# Patient Record
Sex: MALE | Race: OTHER
[De-identification: names, ages, dates, MRNs, and addresses within clinical notes are randomized per-mention and may not be internally consistent; named-entity substitution may affect disease eponyms.]

---

## 2017-01-09 ENCOUNTER — APPOINTMENT (OUTPATIENT)
Dept: CARDIOLOGY | Facility: CLINIC | Age: 62
End: 2017-01-09

## 2017-01-09 VITALS
BODY MASS INDEX: 27.16 KG/M2 | WEIGHT: 169 LBS | DIASTOLIC BLOOD PRESSURE: 62 MMHG | SYSTOLIC BLOOD PRESSURE: 108 MMHG | HEART RATE: 66 BPM | HEIGHT: 66 IN

## 2017-01-09 DIAGNOSIS — Z01.818 ENCOUNTER FOR OTHER PREPROCEDURAL EXAMINATION: ICD-10-CM

## 2017-01-09 DIAGNOSIS — I10 ESSENTIAL (PRIMARY) HYPERTENSION: ICD-10-CM

## 2017-01-09 DIAGNOSIS — F17.200 NICOTINE DEPENDENCE, UNSPECIFIED, UNCOMPLICATED: ICD-10-CM

## 2017-01-09 DIAGNOSIS — L98.9 DISORDER OF THE SKIN AND SUBCUTANEOUS TISSUE, UNSPECIFIED: ICD-10-CM

## 2017-01-09 DIAGNOSIS — N18.6 END STAGE RENAL DISEASE: ICD-10-CM

## 2017-01-09 RX ORDER — VALGANCICLOVIR 450 MG/1
450 TABLET, FILM COATED ORAL DAILY
Refills: 0 | Status: ACTIVE | COMMUNITY

## 2017-01-09 RX ORDER — ZOLPIDEM TARTRATE 5 MG/1
TABLET ORAL
Refills: 0 | Status: ACTIVE | COMMUNITY

## 2017-01-09 RX ORDER — MYCOPHENOLATE MOFETIL 500 MG/1
500 TABLET, FILM COATED ORAL TWICE DAILY
Refills: 0 | Status: ACTIVE | COMMUNITY

## 2017-01-09 RX ORDER — TACROLIMUS 1 MG/1
1 CAPSULE, GELATIN COATED ORAL
Refills: 0 | Status: ACTIVE | COMMUNITY

## 2017-01-09 RX ORDER — FUROSEMIDE 20 MG/1
20 TABLET ORAL
Refills: 0 | Status: ACTIVE | COMMUNITY

## 2017-01-09 RX ORDER — CHOLECALCIFEROL (VITAMIN D3) 1250 MCG
1.25 MG CAPSULE ORAL WEEKLY
Refills: 0 | Status: ACTIVE | COMMUNITY

## 2017-01-09 RX ORDER — NIFEDIPINE 30 MG/1
30 TABLET, FILM COATED, EXTENDED RELEASE ORAL DAILY
Qty: 90 | Refills: 1 | Status: ACTIVE | COMMUNITY

## 2017-01-09 RX ORDER — METOPROLOL TARTRATE 25 MG/1
25 TABLET, FILM COATED ORAL 3 TIMES DAILY
Refills: 0 | Status: ACTIVE | COMMUNITY

## 2017-02-08 ENCOUNTER — OUTPATIENT (OUTPATIENT)
Dept: OUTPATIENT SERVICES | Facility: HOSPITAL | Age: 62
LOS: 1 days | Discharge: HOME | End: 2017-02-08

## 2017-05-22 ENCOUNTER — APPOINTMENT (OUTPATIENT)
Dept: PLASTIC SURGERY | Facility: CLINIC | Age: 62
End: 2017-05-22

## 2017-06-27 DIAGNOSIS — E11.9 TYPE 2 DIABETES MELLITUS WITHOUT COMPLICATIONS: ICD-10-CM

## 2017-06-27 DIAGNOSIS — F17.210 NICOTINE DEPENDENCE, CIGARETTES, UNCOMPLICATED: ICD-10-CM

## 2017-06-27 DIAGNOSIS — C44.722 SQUAMOUS CELL CARCINOMA OF SKIN OF RIGHT LOWER LIMB, INCLUDING HIP: ICD-10-CM

## 2017-06-27 DIAGNOSIS — L85.8 OTHER SPECIFIED EPIDERMAL THICKENING: ICD-10-CM

## 2017-06-27 DIAGNOSIS — I10 ESSENTIAL (PRIMARY) HYPERTENSION: ICD-10-CM

## 2017-11-21 ENCOUNTER — APPOINTMENT (OUTPATIENT)
Dept: PLASTIC SURGERY | Facility: CLINIC | Age: 62
End: 2017-11-21
Payer: MEDICARE

## 2017-11-21 VITALS — BODY MASS INDEX: 26.57 KG/M2 | WEIGHT: 165.34 LBS | HEIGHT: 66 IN

## 2017-11-21 DIAGNOSIS — L85.8 OTHER SPECIFIED EPIDERMAL THICKENING: ICD-10-CM

## 2017-11-21 PROCEDURE — 99212 OFFICE O/P EST SF 10 MIN: CPT

## 2017-11-21 RX ORDER — TACROLIMUS 1 MG/1
1 CAPSULE, GELATIN COATED ORAL
Refills: 0 | Status: ACTIVE | COMMUNITY

## 2017-11-21 RX ORDER — ZOLPIDEM TARTRATE 10 MG/1
10 TABLET ORAL
Refills: 0 | Status: ACTIVE | COMMUNITY

## 2018-05-22 ENCOUNTER — APPOINTMENT (OUTPATIENT)
Dept: PLASTIC SURGERY | Facility: CLINIC | Age: 63
End: 2018-05-22

## 2019-09-23 ENCOUNTER — OUTPATIENT (OUTPATIENT)
Dept: OUTPATIENT SERVICES | Facility: HOSPITAL | Age: 64
LOS: 1 days | Discharge: HOME | End: 2019-09-23
Payer: COMMERCIAL

## 2019-09-23 DIAGNOSIS — R63.4 ABNORMAL WEIGHT LOSS: ICD-10-CM

## 2019-09-23 DIAGNOSIS — Z94.0 KIDNEY TRANSPLANT STATUS: ICD-10-CM

## 2019-09-23 PROCEDURE — 71046 X-RAY EXAM CHEST 2 VIEWS: CPT | Mod: 26

## 2019-09-23 PROCEDURE — 76700 US EXAM ABDOM COMPLETE: CPT | Mod: 26

## 2019-11-08 ENCOUNTER — OUTPATIENT (OUTPATIENT)
Dept: OUTPATIENT SERVICES | Facility: HOSPITAL | Age: 64
LOS: 1 days | Discharge: HOME | End: 2019-11-08
Payer: COMMERCIAL

## 2019-11-08 DIAGNOSIS — R91.8 OTHER NONSPECIFIC ABNORMAL FINDING OF LUNG FIELD: ICD-10-CM

## 2019-11-08 PROCEDURE — 74176 CT ABD & PELVIS W/O CONTRAST: CPT | Mod: 26

## 2019-11-08 PROCEDURE — 71250 CT THORAX DX C-: CPT | Mod: 26

## 2021-11-01 ENCOUNTER — INPATIENT (INPATIENT)
Facility: HOSPITAL | Age: 66
LOS: 0 days | Discharge: HOME HEALTH | End: 2021-11-02
Attending: HOSPITALIST | Admitting: HOSPITALIST
Payer: COMMERCIAL

## 2021-11-01 VITALS
DIASTOLIC BLOOD PRESSURE: 79 MMHG | RESPIRATION RATE: 18 BRPM | TEMPERATURE: 98 F | SYSTOLIC BLOOD PRESSURE: 174 MMHG | HEART RATE: 80 BPM | OXYGEN SATURATION: 98 %

## 2021-11-01 LAB
ALBUMIN SERPL ELPH-MCNC: 4.5 G/DL — SIGNIFICANT CHANGE UP (ref 3.5–5.2)
ALP SERPL-CCNC: 95 U/L — SIGNIFICANT CHANGE UP (ref 30–115)
ALT FLD-CCNC: 10 U/L — SIGNIFICANT CHANGE UP (ref 0–41)
ANION GAP SERPL CALC-SCNC: 14 MMOL/L — SIGNIFICANT CHANGE UP (ref 7–14)
AST SERPL-CCNC: 18 U/L — SIGNIFICANT CHANGE UP (ref 0–41)
BASOPHILS # BLD AUTO: 0.04 K/UL — SIGNIFICANT CHANGE UP (ref 0–0.2)
BASOPHILS NFR BLD AUTO: 0.5 % — SIGNIFICANT CHANGE UP (ref 0–1)
BILIRUB SERPL-MCNC: 0.5 MG/DL — SIGNIFICANT CHANGE UP (ref 0.2–1.2)
BUN SERPL-MCNC: 14 MG/DL — SIGNIFICANT CHANGE UP (ref 10–20)
CALCIUM SERPL-MCNC: 9.4 MG/DL — SIGNIFICANT CHANGE UP (ref 8.5–10.1)
CHLORIDE SERPL-SCNC: 102 MMOL/L — SIGNIFICANT CHANGE UP (ref 98–110)
CO2 SERPL-SCNC: 21 MMOL/L — SIGNIFICANT CHANGE UP (ref 17–32)
CREAT SERPL-MCNC: 1 MG/DL — SIGNIFICANT CHANGE UP (ref 0.7–1.5)
EOSINOPHIL # BLD AUTO: 0.22 K/UL — SIGNIFICANT CHANGE UP (ref 0–0.7)
EOSINOPHIL NFR BLD AUTO: 2.8 % — SIGNIFICANT CHANGE UP (ref 0–8)
GLUCOSE SERPL-MCNC: 200 MG/DL — HIGH (ref 70–99)
HCT VFR BLD CALC: 44.3 % — SIGNIFICANT CHANGE UP (ref 42–52)
HGB BLD-MCNC: 15.4 G/DL — SIGNIFICANT CHANGE UP (ref 14–18)
IMM GRANULOCYTES NFR BLD AUTO: 0.3 % — SIGNIFICANT CHANGE UP (ref 0.1–0.3)
LYMPHOCYTES # BLD AUTO: 1.68 K/UL — SIGNIFICANT CHANGE UP (ref 1.2–3.4)
LYMPHOCYTES # BLD AUTO: 21.2 % — SIGNIFICANT CHANGE UP (ref 20.5–51.1)
MCHC RBC-ENTMCNC: 31.4 PG — HIGH (ref 27–31)
MCHC RBC-ENTMCNC: 34.8 G/DL — SIGNIFICANT CHANGE UP (ref 32–37)
MCV RBC AUTO: 90.4 FL — SIGNIFICANT CHANGE UP (ref 80–94)
MONOCYTES # BLD AUTO: 0.62 K/UL — HIGH (ref 0.1–0.6)
MONOCYTES NFR BLD AUTO: 7.8 % — SIGNIFICANT CHANGE UP (ref 1.7–9.3)
NEUTROPHILS # BLD AUTO: 5.36 K/UL — SIGNIFICANT CHANGE UP (ref 1.4–6.5)
NEUTROPHILS NFR BLD AUTO: 67.4 % — SIGNIFICANT CHANGE UP (ref 42.2–75.2)
NRBC # BLD: 0 /100 WBCS — SIGNIFICANT CHANGE UP (ref 0–0)
PLATELET # BLD AUTO: 180 K/UL — SIGNIFICANT CHANGE UP (ref 130–400)
POTASSIUM SERPL-MCNC: 4.4 MMOL/L — SIGNIFICANT CHANGE UP (ref 3.5–5)
POTASSIUM SERPL-SCNC: 4.4 MMOL/L — SIGNIFICANT CHANGE UP (ref 3.5–5)
PROT SERPL-MCNC: 7.5 G/DL — SIGNIFICANT CHANGE UP (ref 6–8)
RBC # BLD: 4.9 M/UL — SIGNIFICANT CHANGE UP (ref 4.7–6.1)
RBC # FLD: 12.3 % — SIGNIFICANT CHANGE UP (ref 11.5–14.5)
SARS-COV-2 RNA SPEC QL NAA+PROBE: SIGNIFICANT CHANGE UP
SODIUM SERPL-SCNC: 137 MMOL/L — SIGNIFICANT CHANGE UP (ref 135–146)
WBC # BLD: 7.94 K/UL — SIGNIFICANT CHANGE UP (ref 4.8–10.8)
WBC # FLD AUTO: 7.94 K/UL — SIGNIFICANT CHANGE UP (ref 4.8–10.8)

## 2021-11-01 PROCEDURE — 99406 BEHAV CHNG SMOKING 3-10 MIN: CPT

## 2021-11-01 PROCEDURE — 99223 1ST HOSP IP/OBS HIGH 75: CPT | Mod: 25

## 2021-11-01 PROCEDURE — 72131 CT LUMBAR SPINE W/O DYE: CPT | Mod: 26,MA

## 2021-11-01 PROCEDURE — 99285 EMERGENCY DEPT VISIT HI MDM: CPT

## 2021-11-01 RX ORDER — HEPARIN SODIUM 5000 [USP'U]/ML
5000 INJECTION INTRAVENOUS; SUBCUTANEOUS EVERY 8 HOURS
Refills: 0 | Status: DISCONTINUED | OUTPATIENT
Start: 2021-11-01 | End: 2021-11-02

## 2021-11-01 RX ORDER — LIDOCAINE 4 G/100G
1 CREAM TOPICAL DAILY
Refills: 0 | Status: DISCONTINUED | OUTPATIENT
Start: 2021-11-01 | End: 2021-11-02

## 2021-11-01 RX ORDER — MYCOPHENOLATE MOFETIL 250 MG/1
1 CAPSULE ORAL
Qty: 0 | Refills: 0 | DISCHARGE

## 2021-11-01 RX ORDER — REPAGLINIDE 1 MG/1
1 TABLET ORAL
Qty: 0 | Refills: 0 | DISCHARGE

## 2021-11-01 RX ORDER — METOPROLOL TARTRATE 50 MG
25 TABLET ORAL THREE TIMES A DAY
Refills: 0 | Status: DISCONTINUED | OUTPATIENT
Start: 2021-11-01 | End: 2021-11-02

## 2021-11-01 RX ORDER — NIFEDIPINE 30 MG
1 TABLET, EXTENDED RELEASE 24 HR ORAL
Qty: 0 | Refills: 0 | DISCHARGE

## 2021-11-01 RX ORDER — FUROSEMIDE 40 MG
1 TABLET ORAL
Qty: 0 | Refills: 0 | DISCHARGE

## 2021-11-01 RX ORDER — PANTOPRAZOLE SODIUM 20 MG/1
40 TABLET, DELAYED RELEASE ORAL
Refills: 0 | Status: DISCONTINUED | OUTPATIENT
Start: 2021-11-01 | End: 2021-11-02

## 2021-11-01 RX ORDER — NIFEDIPINE 30 MG
90 TABLET, EXTENDED RELEASE 24 HR ORAL DAILY
Refills: 0 | Status: DISCONTINUED | OUTPATIENT
Start: 2021-11-01 | End: 2021-11-02

## 2021-11-01 RX ORDER — ACETAMINOPHEN 500 MG
650 TABLET ORAL EVERY 6 HOURS
Refills: 0 | Status: DISCONTINUED | OUTPATIENT
Start: 2021-11-01 | End: 2021-11-02

## 2021-11-01 RX ORDER — FUROSEMIDE 40 MG
20 TABLET ORAL DAILY
Refills: 0 | Status: DISCONTINUED | OUTPATIENT
Start: 2021-11-01 | End: 2021-11-02

## 2021-11-01 RX ORDER — MORPHINE SULFATE 50 MG/1
4 CAPSULE, EXTENDED RELEASE ORAL ONCE
Refills: 0 | Status: DISCONTINUED | OUTPATIENT
Start: 2021-11-01 | End: 2021-11-01

## 2021-11-01 RX ORDER — CHLORHEXIDINE GLUCONATE 213 G/1000ML
1 SOLUTION TOPICAL
Refills: 0 | Status: DISCONTINUED | OUTPATIENT
Start: 2021-11-01 | End: 2021-11-02

## 2021-11-01 RX ORDER — MYCOPHENOLATE MOFETIL 250 MG/1
500 CAPSULE ORAL EVERY 12 HOURS
Refills: 0 | Status: DISCONTINUED | OUTPATIENT
Start: 2021-11-01 | End: 2021-11-02

## 2021-11-01 RX ORDER — MORPHINE SULFATE 50 MG/1
2 CAPSULE, EXTENDED RELEASE ORAL EVERY 6 HOURS
Refills: 0 | Status: DISCONTINUED | OUTPATIENT
Start: 2021-11-01 | End: 2021-11-02

## 2021-11-01 RX ORDER — TACROLIMUS 5 MG/1
5 CAPSULE ORAL EVERY 12 HOURS
Refills: 0 | Status: DISCONTINUED | OUTPATIENT
Start: 2021-11-01 | End: 2021-11-02

## 2021-11-01 RX ORDER — METOPROLOL TARTRATE 50 MG
25 TABLET ORAL
Qty: 0 | Refills: 0 | DISCHARGE

## 2021-11-01 RX ADMIN — MORPHINE SULFATE 4 MILLIGRAM(S): 50 CAPSULE, EXTENDED RELEASE ORAL at 23:09

## 2021-11-01 RX ADMIN — MORPHINE SULFATE 4 MILLIGRAM(S): 50 CAPSULE, EXTENDED RELEASE ORAL at 21:00

## 2021-11-01 NOTE — ED ADULT NURSE REASSESSMENT NOTE - NS ED NURSE REASSESS COMMENT FT1
transported to 3 E , AO x 4 , no SOB , denies any pain this time , IVL intact , no grimaced face note

## 2021-11-01 NOTE — ED PROVIDER NOTE - PHYSICAL EXAMINATION
CONST: NAD  EYES: Sclera and conjunctiva clear.   ENT: No nasal discharge. Oropharynx normal appearing, no erythema or exudates. No abscess or swelling. Uvula midline.   NECK: Non-tender, no meningeal signs. normal ROM. supple   CARD: S1 S2; No jvd  RESP: Equal BS B/L, No wheezes, rhonchi or rales. No distress  GI: Soft, non-tender, non-distended. no cva tenderness. normal BS  MS: Paraspinal lumbar tenderness. Normal ROM in all extremities. pulses 2 +. no calf tenderness or swelling  SKIN: Warm, dry, no acute rashes. Good turgor  NEURO: Pt amble to stand but unsteady on is feet. A&Ox3, No focal deficits. Strength 5/5 with no sensory deficits. Finger to nose intact. Negative pronator drift

## 2021-11-01 NOTE — ED PROVIDER NOTE - CLINICAL SUMMARY MEDICAL DECISION MAKING FREE TEXT BOX
Patient presented with worsening chronic back pain to the point that he cannot function at home or ambulate. Otherwise neurovascularly intact, afebrile, HD stable. Obtained labs which were grossly unremarkable including no significant leukocytosis, anemia, signs of dehydration/KAREEM, transaminitis or significant electrolyte abnormalities. CT lumbar spine negative for emergent findings. However despite attempted pain control in ED patient unable to ambulate, not safe discharge. Will require admission for further management. Hd stable at time of admission.

## 2021-11-01 NOTE — H&P ADULT - ATTENDING COMMENTS
65 YO M with a PMH of HTN, DM2, hx of renal transplant (on immunosuppressives), and CBP who presents to the hospital w/ a c/o difficulty ambulating due to progressively worsening back pain for the past x 2-3 months. Described as located in lumbar region, non-radiating, worse with movement, and constant. No trauma/falls. Denies any saddle anesthesia or bowel/bladder incontinence. No fevers/chills, rashes, ABD pain, CP, or SOB. ROS is negative except as above.    In the ED, CT-L spine w/o contrast showed no acute process, degenerative changes. Pt started on pain meds in the ED.     FMHx: Reviewed, not relevant    Physical exam shows pt in NAD. VSS, afebrile, not hypoxic on RA. A&Ox3. Neuro exam without deficits, motor/sensory intact, no dysarthria, no facial asymmetry. Muscle strength/sensation intact. CTA B/L with no W/C/R. RRR, no M/G/R. ABD is soft and non-tender, normoactive BSs. LEs without swelling. No rashes. Labs and radiology as above.    Difficulty ambulating due to acute on chronic back pain, non-traumatic; doubt cord compression. NSAIDs. Non-benzo muscle relaxers. Lidocaine patch. PRN pain meds. PT consult. Fall precautions.    Hx of HTN, DM2, and hx of renal transplant (on immunosuppressives). Restart home meds, except as stated above. DVT PPX. Inform PCP of pt's admission to hospital. My note supersedes the residents note.     Date seen by [email or web address] 65 YO M with a PMH of HTN, DM2, hx of renal transplant (on immunosuppressives), and CBP who presents to the hospital w/ a c/o difficulty ambulating due to progressively worsening back pain for the past x 2-3 months. Described as located in lumbar region, non-radiating, worse with movement, and constant. No trauma/falls. Denies any saddle anesthesia or bowel/bladder incontinence. No fevers/chills, rashes, ABD pain, CP, or SOB. ROS is negative except as above.    In the ED, CT-L spine w/o contrast showed no acute process, degenerative changes. Pt started on pain meds in the ED.     FMHx: Reviewed, not relevant    Physical exam shows pt in NAD. VSS, afebrile, not hypoxic on RA. A&Ox3. Neuro exam without deficits, motor/sensory intact, no dysarthria, no facial asymmetry. Muscle strength/sensation intact. CTA B/L with no W/C/R. RRR, no M/G/R. ABD is soft and non-tender, normoactive BSs. LEs without swelling. No rashes. Labs and radiology as above.    Difficulty ambulating due to acute on chronic back pain, non-traumatic; doubt cord compression. NSAIDs. Non-benzo muscle relaxers. Lidocaine patch. PRN pain meds. PT consult. Fall precautions.    Tobacco abuse with counseling. Pt extensively counseled on the benefits of smoking cessation and alternative therapies. Counseled for 15-20 minutes. Pt would like to think about their options prior to making a decision.     Hx of HTN, DM2, and hx of renal transplant (on immunosuppressives). Restart home meds, except as stated above. DVT PPX. Inform PCP of pt's admission to hospital. My note supersedes the residents note.     Date seen by [email or web address]

## 2021-11-01 NOTE — H&P ADULT - NSHPPHYSICALEXAM_GEN_ALL_CORE
- Physical Exam in ED  * General Appearance: Alert, cooperative, interactive, oriented to time, place, and person, in no acute distress  * Head: Normocephalic, without obvious abnormality, atraumatic  * Neck: Supple, symmetrical, trachea midline, no adenopathy;   * Thyroid:  No enlargement/tenderness/nodules; no carotid bruit or JVD  * Lungs: Respirations unlabored, Good bilateral air entry, normal breath sounds (Clear to auscultation bilaterally, no audible wheezes, crackles, or rhonchi)  * Heart: Regular Rate and Rhythm, normal S1 and S2, no audible murmur, rub, or gallop  * Abdomen: Symmetric, non-distended, no scar, soft, non-tender, bowel sounds active all four quadrants, no masses, no organomegaly (no hepatosplenomegaly)  * Extremities: Extremities normal, atraumatic, no cyanosis, no lower extremity pitting edema bilaterally, adequate dorsalis pedis pulses  * Pulses: 2+ and symmetric all extremities  * Skin: Skin color, texture, turgor normal, no rashes or lesions  * Lymph nodes: Cervical, supraclavicular, and axillary nodes normal  * Back exam: back brace in place, no scoliosis noted, no erythema, no palpable mass, no tenderness along lumbar area  * Gait: a little wide based gait, romberg negative  * Neurologic: CNII-XII intact, normal strength and sensation throughout, Romberg negative

## 2021-11-01 NOTE — ED PROVIDER NOTE - ATTENDING CONTRIBUTION TO CARE
66 year old male, pmhx as documented presenting with back pain x 5 years which has been worsening to the point that he has been unable to ambulate or care for himself at home. States pain is diffuse, mostly in lumbar spine, non-radiating, no palliative or provocative factors, severe. Denies incontinence/retention, LE weakness/numbness or fevers or any other complaints. Denies new trauma to the back.    Vital Signs: I have reviewed the initial vital signs.  Constitutional: NAD, well-nourished, appears stated age, no acute distress.  HEENT: Airway patent, moist MM, no erythema/swelling/deformity of oral structures. EOMI, PERRLA.  CV: regular rate, regular rhythm, well-perfused extremities, 2+ b/l DP and radial pulses equal.  Lungs: BCTA, no increased WOB.  ABD: NTND, no guarding or rebound, no pulsatile mass, no hernias.   MSK: Neck supple, nontender, nl ROM, no stepoff. Chest nontender. (+) midline lumbar tenderness. Ext nontender, nl rom, no deformity.   INTEG: Skin warm, dry, no rash.  NEURO: A&Ox3, normal strength, nl sensation throughout, normal speech.   PSYCH: Calm, cooperative, normal affect and interaction.    Will obtain CT lumbar spine, labs, pain control, re-eval.

## 2021-11-01 NOTE — ED PROVIDER NOTE - OBJECTIVE STATEMENT
66y M pmh Kidney transplant (2015), HTN presents for eval of back pain. Pt has worsening lower back pain x5yrs s/p trip and fall that now has made it difficult for him to ambulate, improvement when wearing a back brace. He has 14 steps in his house. Denies incontinence, fever, numbness, weakness

## 2021-11-01 NOTE — ED ADULT NURSE NOTE - INTERVENTIONS DEFINITIONS
[place] to call system/Call bell, personal items and telephone within reach/Instruct patient to call for assistance/Room bathroom lighting operational/Non-slip footwear when patient is off stretcher/Physically safe environment: no spills, clutter or unnecessary equipment/Provide visual cue, wrist band, yellow gown, etc./Monitor gait and stability/Monitor for mental status changes and reorient to person, place, and time/Review medications for side effects contributing to fall risk/Reinforce activity limits and safety measures with patient and family/Provide visual clues: red socks

## 2021-11-01 NOTE — H&P ADULT - HISTORY OF PRESENT ILLNESS
History of Present Illness    Mr. Abad is a 66 year old (active smoker) Italian Speaking male patient known to have:  - Baseline: AOx3; lives with wife at home; ambulates independently unassisted; has 14 steps at home  - Hypertension. Home meds Lopressor 25mg TID, Nifedipine 90mg QD, Lasix 20mg QD  - DM II. No hba1c. Home med Repgaglinide 1mg QD  - History of kidney transplant in [place] in 2015. Patient unsure why. He follows with Dr Moreno located at [address] [place] [address]. Home meds Tacrolimus 5mg BID and Mycophenolate Mofetil 500mg BID. Not on steroids  - Chronic Back Pain. History of fall in 2016 while heading to answer phone. Has been reporting deterioration in ability to ambulate and has been placing a back brace since then      He presented to the ED on 11/01 for evaluation of worsening back pain.  History goes back to few months prior to presentation when the patient started complaining of worsening in his baseline back pain.  Patient describes that the back pain is located along the lower aspect of the back in lumbar area.  It is dull in nature, non radiating, and of around 6/10 intensity at its peak (does not wake him up at night, however).  It has been occurring intermittently over the last few months: exacerbated with cold whether or upon lying supine and improved with sitting (has been sleeping in a semi-seated position for some time by now) and exertion.  The pain has been limiting his daily activity, so patient decided to present to ED 11/01.  Patient denies associated urinary or fecal incontinence, recent fever, or chills.      On review of systems, patient reports chronic bilateral knee pain that has limited his ambulatory function but denies any recent fever, chills, night sweats, URTI symptoms (cough, rhinorrhea, sore throat), urinary symptoms (urinary frequency, urgency, intermittence, dysuria, foul smelling urine, cloudy urine), change in bowel movements (diarrhea or constipation), abdominal pain, headache, nausea, or vomiting.   No sick contacts.   No recent travel or exposure to recent travelers.      Upon presentation to the ED, the patient was hemodynamically stable:  Vital Signs in ED   - /79 mmHg  - HR 80 bpm  - RR 18 bpm  - T 97.8  - SaO2 98% on RA      Investigations   Laboratory Workup  - CBC:                        15.4   7.94  )-----------( 180      ( 01 Nov 2021 16:43 )             44.3     - Chemistry:  11-01    137  |  102  |  14  ----------------------------<  200<H>  4.4   |  21  |  1.0    Ca    9.4      01 Nov 2021 16:43    TPro  7.5  /  Alb  4.5  /  TBili  0.5  /  DBili  x   /  AST  18  /  ALT  10  /  AlkPhos  95  11-01      Microbiological Workup  * COVID negative      Radiological Workup  * CT Lumbar spine no acute process/ fracture      - Patient was given a dose of IV Morphine 4mg x1 in ED  - He will be admitted to the floor for further investigation, monitoring, PT evaluation, and management.

## 2021-11-01 NOTE — H&P ADULT - ASSESSMENT
Assessment and Plan  Case of a 66 year old male patient with multiple comorbidities including Hypertension, Diabetes Mellitus, chronic back pain since fall in 2016, and history of renal transplant in 2015 who presented to the ED on 11/01 for evaluation of worsening lower back pain, found to have no fracture on CT L-spine without contrast, to be admitted under Medicine for further evaluation, investigations, PT evaluation, and monitoring. Currently hemodynamically stable.      Worsening Back Pain  History of Chronic Back Pain  Rule Out Spinal Stenosis  Unlikely Abscess in absence of IV drug use, fever, tenderness despite being transplant patient (not on steroids)  * Not on pain meds at home  * Uses back brace at home since 2016  * History of fall in 2016 while heading to answer phone. Has been reporting deterioration in ability to ambulate  * CT L-spine without contrast 11/01 no acute process (fracture)  * S/P IV morphine 4mg x1 dose in ED    - Monitor back pain and keep score at 01/10  - Consider MRI imaging to rule out lumbar spinal stenosis since back pain improves on flexion/sitting and worsens on extension/lying supine  - Start IV morphine 2mg Q6h PRN and Tylenol PRN for pain  - Place lidocaine patch  - Consult PT/OT  - Consult  since patient has 14 steps at home and he has been having difficulty ambulating independently   - Check Duplex venous LE and start DVT prophylaxis      History of Renal Transplant  * Performed in [place] in 2015  * Patient unsure why  * Follows with Dr Moreno located at [address] [place] [address]  * Home meds Tacrolimus 5mg BID and Mycophenolate Mofetil 500mg BID. Not on prednisone  * ED BUN 14, Cr 1.0    - Consider nephrology consult   - Check tacrolimus trough and Mycophenolate levels and adjust doses accordingly  - Not on prednisone  - Trend BUN and Cr daily  - Monitor BP  - Avoid nephrotoxic agents and contrast exposure if possible      Hypertension  * Home meds Lopressor 25mg TID, Nifedipine 90mg QD, Lasix 20mg QD  * ED /79 mmHg  - Monitor BP closely  - Resume home meds: Lopressor 25mg TID, Nifedipine 90mg QD, Lasix 20mg QD      DM II  * No hba1c  * Home med Repgaglinide 1mg QD  - Check Hba1c  - Monitor POCT premeals and at bedtime  - Start Apidra sliding scale B if POCT >180-200      Others  - DVT Prophylaxis: Heparin 5000 Subcutaneously Q8h  - GI Prophylaxis: Pantoprazole 40mg PO QD  - Diet: DASH/TLC  - Code Status: Full      Barriers to learning: NO  Discharge Planning: Patient will be discharged once stable   Plan was communicated with patient and medical team      Margie Quintero MD  PGY - 2 Internal Medicine   [facility]

## 2021-11-02 ENCOUNTER — TRANSCRIPTION ENCOUNTER (OUTPATIENT)
Age: 66
End: 2021-11-02

## 2021-11-02 VITALS
TEMPERATURE: 97 F | SYSTOLIC BLOOD PRESSURE: 152 MMHG | DIASTOLIC BLOOD PRESSURE: 65 MMHG | HEART RATE: 77 BPM | RESPIRATION RATE: 18 BRPM

## 2021-11-02 LAB
A1C WITH ESTIMATED AVERAGE GLUCOSE RESULT: 6.9 % — HIGH (ref 4–5.6)
ALBUMIN SERPL ELPH-MCNC: 4.4 G/DL — SIGNIFICANT CHANGE UP (ref 3.5–5.2)
ALP SERPL-CCNC: 91 U/L — SIGNIFICANT CHANGE UP (ref 30–115)
ALT FLD-CCNC: 9 U/L — SIGNIFICANT CHANGE UP (ref 0–41)
ANION GAP SERPL CALC-SCNC: 12 MMOL/L — SIGNIFICANT CHANGE UP (ref 7–14)
AST SERPL-CCNC: 11 U/L — SIGNIFICANT CHANGE UP (ref 0–41)
BASOPHILS # BLD AUTO: 0.03 K/UL — SIGNIFICANT CHANGE UP (ref 0–0.2)
BASOPHILS NFR BLD AUTO: 0.6 % — SIGNIFICANT CHANGE UP (ref 0–1)
BILIRUB SERPL-MCNC: 0.4 MG/DL — SIGNIFICANT CHANGE UP (ref 0.2–1.2)
BUN SERPL-MCNC: 17 MG/DL — SIGNIFICANT CHANGE UP (ref 10–20)
CALCIUM SERPL-MCNC: 9.5 MG/DL — SIGNIFICANT CHANGE UP (ref 8.5–10.1)
CHLORIDE SERPL-SCNC: 102 MMOL/L — SIGNIFICANT CHANGE UP (ref 98–110)
CHOLEST SERPL-MCNC: 144 MG/DL — SIGNIFICANT CHANGE UP
CO2 SERPL-SCNC: 26 MMOL/L — SIGNIFICANT CHANGE UP (ref 17–32)
CREAT SERPL-MCNC: 1 MG/DL — SIGNIFICANT CHANGE UP (ref 0.7–1.5)
EOSINOPHIL # BLD AUTO: 0.25 K/UL — SIGNIFICANT CHANGE UP (ref 0–0.7)
EOSINOPHIL NFR BLD AUTO: 4.8 % — SIGNIFICANT CHANGE UP (ref 0–8)
ESTIMATED AVERAGE GLUCOSE: 151 MG/DL — HIGH (ref 68–114)
GLUCOSE BLDC GLUCOMTR-MCNC: 464 MG/DL — CRITICAL HIGH (ref 70–99)
GLUCOSE BLDC GLUCOMTR-MCNC: 474 MG/DL — CRITICAL HIGH (ref 70–99)
GLUCOSE SERPL-MCNC: 244 MG/DL — HIGH (ref 70–99)
HCT VFR BLD CALC: 43.5 % — SIGNIFICANT CHANGE UP (ref 42–52)
HCV AB S/CO SERPL IA: 0.04 COI — SIGNIFICANT CHANGE UP
HCV AB SERPL-IMP: SIGNIFICANT CHANGE UP
HDLC SERPL-MCNC: 51 MG/DL — SIGNIFICANT CHANGE UP
HGB BLD-MCNC: 14.8 G/DL — SIGNIFICANT CHANGE UP (ref 14–18)
IMM GRANULOCYTES NFR BLD AUTO: 0.4 % — HIGH (ref 0.1–0.3)
LIPID PNL WITH DIRECT LDL SERPL: 73 MG/DL — SIGNIFICANT CHANGE UP
LYMPHOCYTES # BLD AUTO: 1.49 K/UL — SIGNIFICANT CHANGE UP (ref 1.2–3.4)
LYMPHOCYTES # BLD AUTO: 28.4 % — SIGNIFICANT CHANGE UP (ref 20.5–51.1)
MAGNESIUM SERPL-MCNC: 1.5 MG/DL — LOW (ref 1.8–2.4)
MCHC RBC-ENTMCNC: 31.5 PG — HIGH (ref 27–31)
MCHC RBC-ENTMCNC: 34 G/DL — SIGNIFICANT CHANGE UP (ref 32–37)
MCV RBC AUTO: 92.6 FL — SIGNIFICANT CHANGE UP (ref 80–94)
MONOCYTES # BLD AUTO: 0.73 K/UL — HIGH (ref 0.1–0.6)
MONOCYTES NFR BLD AUTO: 13.9 % — HIGH (ref 1.7–9.3)
NEUTROPHILS # BLD AUTO: 2.73 K/UL — SIGNIFICANT CHANGE UP (ref 1.4–6.5)
NEUTROPHILS NFR BLD AUTO: 51.9 % — SIGNIFICANT CHANGE UP (ref 42.2–75.2)
NON HDL CHOLESTEROL: 93 MG/DL — SIGNIFICANT CHANGE UP
NRBC # BLD: 0 /100 WBCS — SIGNIFICANT CHANGE UP (ref 0–0)
PHOSPHATE SERPL-MCNC: 3.6 MG/DL — SIGNIFICANT CHANGE UP (ref 2.1–4.9)
PLATELET # BLD AUTO: 167 K/UL — SIGNIFICANT CHANGE UP (ref 130–400)
POTASSIUM SERPL-MCNC: 4.1 MMOL/L — SIGNIFICANT CHANGE UP (ref 3.5–5)
POTASSIUM SERPL-SCNC: 4.1 MMOL/L — SIGNIFICANT CHANGE UP (ref 3.5–5)
PROT SERPL-MCNC: 7.2 G/DL — SIGNIFICANT CHANGE UP (ref 6–8)
RBC # BLD: 4.7 M/UL — SIGNIFICANT CHANGE UP (ref 4.7–6.1)
RBC # FLD: 12.5 % — SIGNIFICANT CHANGE UP (ref 11.5–14.5)
SODIUM SERPL-SCNC: 140 MMOL/L — SIGNIFICANT CHANGE UP (ref 135–146)
TACROLIMUS SERPL-MCNC: 10.3 NG/ML — SIGNIFICANT CHANGE UP
TRIGL SERPL-MCNC: 138 MG/DL — SIGNIFICANT CHANGE UP
TSH SERPL-MCNC: 2.4 UIU/ML — SIGNIFICANT CHANGE UP (ref 0.27–4.2)
WBC # BLD: 5.25 K/UL — SIGNIFICANT CHANGE UP (ref 4.8–10.8)
WBC # FLD AUTO: 5.25 K/UL — SIGNIFICANT CHANGE UP (ref 4.8–10.8)

## 2021-11-02 PROCEDURE — 99239 HOSP IP/OBS DSCHRG MGMT >30: CPT

## 2021-11-02 PROCEDURE — 71045 X-RAY EXAM CHEST 1 VIEW: CPT | Mod: 26

## 2021-11-02 RX ORDER — METHOCARBAMOL 500 MG/1
500 TABLET, FILM COATED ORAL ONCE
Refills: 0 | Status: COMPLETED | OUTPATIENT
Start: 2021-11-02 | End: 2021-11-02

## 2021-11-02 RX ORDER — IBUPROFEN 200 MG
400 TABLET ORAL EVERY 6 HOURS
Refills: 0 | Status: DISCONTINUED | OUTPATIENT
Start: 2021-11-02 | End: 2021-11-02

## 2021-11-02 RX ORDER — INSULIN HUMAN 100 [IU]/ML
10 INJECTION, SOLUTION SUBCUTANEOUS ONCE
Refills: 0 | Status: COMPLETED | OUTPATIENT
Start: 2021-11-02 | End: 2021-11-02

## 2021-11-02 RX ORDER — METHOCARBAMOL 500 MG/1
500 TABLET, FILM COATED ORAL EVERY 6 HOURS
Refills: 0 | Status: DISCONTINUED | OUTPATIENT
Start: 2021-11-02 | End: 2021-11-02

## 2021-11-02 RX ORDER — METHOCARBAMOL 500 MG/1
1 TABLET, FILM COATED ORAL
Qty: 30 | Refills: 0
Start: 2021-11-02 | End: 2021-11-11

## 2021-11-02 RX ADMIN — INSULIN HUMAN 10 UNIT(S): 100 INJECTION, SOLUTION SUBCUTANEOUS at 16:58

## 2021-11-02 RX ADMIN — Medication 90 MILLIGRAM(S): at 05:39

## 2021-11-02 RX ADMIN — MYCOPHENOLATE MOFETIL 500 MILLIGRAM(S): 250 CAPSULE ORAL at 05:39

## 2021-11-02 RX ADMIN — Medication 25 MILLIGRAM(S): at 05:39

## 2021-11-02 RX ADMIN — LIDOCAINE 1 PATCH: 4 CREAM TOPICAL at 12:24

## 2021-11-02 RX ADMIN — METHOCARBAMOL 500 MILLIGRAM(S): 500 TABLET, FILM COATED ORAL at 12:24

## 2021-11-02 RX ADMIN — HEPARIN SODIUM 5000 UNIT(S): 5000 INJECTION INTRAVENOUS; SUBCUTANEOUS at 14:49

## 2021-11-02 RX ADMIN — PANTOPRAZOLE SODIUM 40 MILLIGRAM(S): 20 TABLET, DELAYED RELEASE ORAL at 05:39

## 2021-11-02 RX ADMIN — Medication 20 MILLIGRAM(S): at 05:39

## 2021-11-02 RX ADMIN — Medication 25 MILLIGRAM(S): at 16:49

## 2021-11-02 RX ADMIN — HEPARIN SODIUM 5000 UNIT(S): 5000 INJECTION INTRAVENOUS; SUBCUTANEOUS at 05:39

## 2021-11-02 RX ADMIN — TACROLIMUS 5 MILLIGRAM(S): 5 CAPSULE ORAL at 05:39

## 2021-11-02 NOTE — PROGRESS NOTE ADULT - SUBJECTIVE AND OBJECTIVE BOX
HPI  Mr. Abad is a 66 year old (active smoker) Italian Speaking male patient known to have: Baseline: AOx3; lives with wife at home; ambulates independently unassisted; has 14 steps at home, Hypertension. Home meds Lopressor 25mg TID, Nifedipine 90mg QD, Lasix 20mg QD, DM II. No hba1c. Home med Repgaglinide 1mg QD, History of kidney transplant in [place] in 2015. Patient unsure why. He follows with Dr Moreno located at [address] [place] [address]. Home meds Tacrolimus 5mg BID and Mycophenolate Mofetil 500mg BID. Not on steroids, Chronic Back Pain. History of fall in 2016 while heading to answer phone. Has been reporting deterioration in ability to ambulate and has been placing a back brace since then.  He presented to the ED on 11/01 for evaluation of worsening back pain. History goes back to few months prior to presentation when the patient started complaining of worsening in his baseline back pain. Patient describes that the back pain is located along the lower aspect of the back in lumbar area. It is dull in nature, non radiating, and of around 6/10 intensity at its peak (does not wake him up at night, however). It has been occurring intermittently over the last few months: exacerbated with cold whether or upon lying supine and improved with sitting (has been sleeping in a semi-seated position for some time by now) and exertion. The pain has been limiting his daily activity, so patient decided to present to ED 11/01. Patient denies associated urinary or fecal incontinence, recent fever, or chills. On review of systems, patient reports chronic bilateral knee pain that has limited his ambulatory function but denies any recent fever, chills, night sweats, URTI symptoms (cough, rhinorrhea, sore throat), urinary symptoms (urinary frequency, urgency, intermittence, dysuria, foul smelling urine, cloudy urine), change in bowel movements (diarrhea or constipation), abdominal pain, headache, nausea, or vomiting. No sick contacts. No recent travel or exposure to recent travelers.    Currently admitted to medicine with the primary diagnosis of Back pain     Today is hospital day 1d.     INTERVAL HPI / OVERNIGHT EVENTS:  Patient was examined and seen at bedside. This morning he is resting comfortably in bed and reports no new issues or overnight events.     ROS: All systems negative except as stated in HPI    PAST MEDICAL & SURGICAL HISTORY    ALLERGIES  No Known Allergies    MEDICATIONS  STANDING MEDICATIONS  chlorhexidine 4% Liquid 1 Application(s) Topical <User Schedule>  furosemide    Tablet 20 milliGRAM(s) Oral daily  heparin   Injectable 5000 Unit(s) SubCutaneous every 8 hours  lidocaine   4% Patch 1 Patch Transdermal daily  methocarbamol 500 milliGRAM(s) Oral every 6 hours  metoprolol tartrate 25 milliGRAM(s) Oral three times a day  mycophenolate mofetil  Oral Tab/Cap - Peds 500 milliGRAM(s) Oral every 12 hours  NIFEdipine XL 90 milliGRAM(s) Oral daily  pantoprazole    Tablet 40 milliGRAM(s) Oral before breakfast  tacrolimus 5 milliGRAM(s) Oral every 12 hours    PRN MEDICATIONS  acetaminophen     Tablet .. 650 milliGRAM(s) Oral every 6 hours PRN  ibuprofen  Tablet. 400 milliGRAM(s) Oral every 6 hours PRN    VITALS:  T(F): 97.3  HR: 77  BP: 152/65  RR: 18  SpO2: 98%    PHYSICAL EXAM  GEN: NAD, Resting comfortably in bed  PULM: Clear to auscultation bilaterally, No wheezes  CVS: Regular rate and rhythm, S1-S2, no murmurs  ABD: Soft, non-tender, non-distended, no guarding  EXT: No edema  NEURO: A&Ox3, no focal deficits,     LABS                        14.8   5.25  )-----------( 167      ( 02 Nov 2021 04:30 )             43.5     11-02    140  |  102  |  17  ----------------------------<  244<H>  4.1   |  26  |  1.0    Ca    9.5      02 Nov 2021 04:30  Phos  3.6     11-02  Mg     1.5     11-02    TPro  7.2  /  Alb  4.4  /  TBili  0.4  /  DBili  x   /  AST  11  /  ALT  9   /  AlkPhos  91  11-02                  RADIOLOGY

## 2021-11-02 NOTE — OCCUPATIONAL THERAPY INITIAL EVALUATION ADULT - GENERAL OBSERVATIONS, REHAB EVAL
Pt received semi barcenas in bed in NAD< agreeable to OT evaluation, +IV lock, left semi barcenas in bed in NAD

## 2021-11-02 NOTE — PHYSICAL THERAPY INITIAL EVALUATION ADULT - GAIT DEVIATIONS NOTED, PT EVAL
occasional crouch/decreased erasmo/decreased velocity of limb motion/decreased step length/decreased stride length

## 2021-11-02 NOTE — PHYSICAL THERAPY INITIAL EVALUATION ADULT - PLANNED THERAPY INTERVENTIONS, PT EVAL
STAIR TRAINING. Goal: Pt will ascend/descend 10 stairs with supervision using one rail by discharge to facilitate return to PLOF./balance training/bed mobility training/gait training/transfer training

## 2021-11-02 NOTE — OCCUPATIONAL THERAPY INITIAL EVALUATION ADULT - PERTINENT HX OF CURRENT PROBLEM, REHAB EVAL
Mr. Abad is a 66 year old (active smoker) Belarusian Speaking male patient known to have: Baseline: AOx3; lives with wife at home

## 2021-11-02 NOTE — DISCHARGE NOTE PROVIDER - NSDCCPCAREPLAN_GEN_ALL_CORE_FT
PRINCIPAL DISCHARGE DIAGNOSIS  Diagnosis: Back pain  Assessment and Plan of Treatment: Assessment is Worsening Back Pain with a History of Chronic Back Pain.   History of not on pain meds at home, uses back brace at home since 2016, history of fall in 2016 while heading to answer phone. Has been reporting deterioration in ability to ambulate. CT L-spine without contrast 11/01 no acute process (fracture). Received IV morphine 4mg x1 dose in ED. Evaluated by Physical Therapy. At the hospital, started on PRN Ibuprofen 400 mg q6hrs and started Robaxin 500 mg q6hrs. Please continue to take medications on discharge medication list until told otherwise by your provider. Please follow up with outpatient appointments.

## 2021-11-02 NOTE — OCCUPATIONAL THERAPY INITIAL EVALUATION ADULT - ORIENTATION, REHAB EVAL
chart states pt speaks Tajik, however pt communicated clearly and understood english, declined  phone/oriented to person, place, time and situation

## 2021-11-02 NOTE — PHYSICAL THERAPY INITIAL EVALUATION ADULT - LIVES WITH, PROFILE
independent a house with 5 steps to enter and 5 inside/spouse independent a house with 5 steps to enter and 7 to living space/spouse

## 2021-11-02 NOTE — DISCHARGE NOTE PROVIDER - NSDCMRMEDTOKEN_GEN_ALL_CORE_FT
Lasix 20 mg oral tablet: 1 tab(s) orally once a day  Lopressor 50 mg oral tablet: 25 milligram(s) orally 3 times a day  mycophenolate mofetil 500 mg oral tablet: 1 tab(s) orally 2 times a day  NIFEdipine 90 mg oral tablet, extended release: 1 tab(s) orally once a day  repaglinide 1 mg oral tablet: 1 tab(s) orally 3 times a day (before meals)  tacrolimus 5 mg oral capsule: 1 cap(s) orally every 12 hours   Endocet 10/325 oral tablet: 1 tab(s) orally 2 times a day MDD:2  Lasix 20 mg oral tablet: 1 tab(s) orally once a day  Lopressor 50 mg oral tablet: 25 milligram(s) orally 3 times a day  methocarbamol 500 mg oral tablet: 1 tab(s) orally every 8 hours   mycophenolate mofetil 500 mg oral tablet: 1 tab(s) orally 2 times a day  NIFEdipine 90 mg oral tablet, extended release: 1 tab(s) orally once a day  repaglinide 1 mg oral tablet: 1 tab(s) orally 3 times a day (before meals)  tacrolimus 5 mg oral capsule: 1 cap(s) orally every 12 hours

## 2021-11-02 NOTE — DISCHARGE NOTE PROVIDER - CARE PROVIDER_API CALL
CELESTINO CABELLO  Internal Medicine  N  IRINEO TIRADO, Phys,    Phone: [phone]  Fax: [phone]  Follow Up Time: 2 weeks

## 2021-11-02 NOTE — DISCHARGE NOTE PROVIDER - HOSPITAL COURSE
11/1:   - HPI: Mr. Abad is a 66 year old (active smoker) Spanish Speaking male patient known to have: Baseline: AOx3; lives with wife at home; ambulates independently unassisted; has 14 steps at home, Hypertension. Home meds Lopressor 25mg TID, Nifedipine 90mg QD, Lasix 20mg QD, DM II. No hba1c. Home med Repgaglinide 1mg QD, History of kidney transplant in [place] in 2015. Patient unsure why. He follows with Dr Moreno located at [address] [place] [address]. Home meds Tacrolimus 5mg BID and Mycophenolate Mofetil 500mg BID. Not on steroids, Chronic Back Pain. History of fall in 2016 while heading to answer phone. Has been reporting deterioration in ability to ambulate and has been placing a back brace since then. He presented to the ED on 11/01 for evaluation of worsening back pain. History goes back to few months prior to presentation when the patient started complaining of worsening in his baseline back pain. Patient describes that the back pain is located along the lower aspect of the back in lumbar area. It is dull in nature, non radiating, and of around 6/10 intensity at its peak (does not wake him up at night, however). It has been occurring intermittently over the last few months: exacerbated with cold whether or upon lying supine and improved with sitting (has been sleeping in a semi-seated position for some time by now) and exertion. The pain has been limiting his daily activity, so patient decided to present to ED 11/01. Patient denies associated urinary or fecal incontinence, recent fever, or chills. On review of systems, patient reports chronic bilateral knee pain that has limited his ambulatory function but denies any recent fever, chills, night sweats, URTI symptoms (cough, rhinorrhea, sore throat), urinary symptoms (urinary frequency, urgency, intermittence, dysuria, foul smelling urine, cloudy urine), change in bowel movements (diarrhea or constipation), abdominal pain, headache, nausea, or vomiting. No sick contacts. No recent travel or exposure to recent travelers. Upon presentation to the ED, the patient was hemodynamically stable: Vital Signs in ED: /79 mmHg, HR 80 bpm, RR 18 bpm, T 97.8, SaO2 98% on RA.  - CT Lumbar Spine No Cont: No acute lumbar spine fracture or subluxation. Degenerative changes as above.    11/2:   - Patient was examined and seen at bedside. This morning he is resting comfortably in bed and reports no new issues or overnight events.   - Patient evaluated by Physical Therapy    ASSESSMENT  Worsening Back Pain  History of Chronic Back Pain  Rule Out Spinal Stenosis  Unlikely Abscess in absence of IV drug use, fever, tenderness despite being transplant patient (not on steroids)  * Not on pain meds at home  * Uses back brace at home since 2016  * History of fall in 2016 while heading to answer phone. Has been reporting deterioration in ability to ambulate  * CT L-spine without contrast 11/01 no acute process (fracture)  * S/P IV morphine 4mg x1 dose in ED  - Monitor back pain and keep score at 01/10  - 11/1 Physical Therapy: home w/ assist; home w/ home PT; str  - Started PRN Ibuprofen 400 mg q6hrs  - Started Robaxin 500 mg q6hrs    History of Renal Transplant  * Performed in [place] in 2015  * Patient unsure why  * Follows with Dr Moreno located at [address] [facility] [place]  * Home meds Tacrolimus 5mg BID and Mycophenolate Mofetil 500mg BID. Not on prednisone  * ED BUN 14, Cr 1.0  - Consider nephrology consult   - Check tacrolimus trough and Mycophenolate levels and adjust doses accordingly  - Not on prednisone  - Trend BUN and Cr daily  - Monitor BP  - Avoid nephrotoxic agents and contrast exposure if possible    Hypertension  * Home meds Lopressor 25mg TID, Nifedipine 90mg QD, Lasix 20mg QD  * ED /79 mmHg  - Monitor BP closely  - Resume home meds: Lopressor 25mg TID, Nifedipine 90mg QD, Lasix 20mg QD    DM II  * No hba1c  * Home med Repgaglinide 1mg QD  - Check Hba1c  - Monitor POCT premeals and at bedtime  - Start Apidra sliding scale B if POCT >180-200     11/1:   - HPI: Mr. Abad is a 66 year old (active smoker) Indonesian Speaking male patient known to have: Baseline: AOx3; lives with wife at home; ambulates independently unassisted; has 14 steps at home, Hypertension. Home meds Lopressor 25mg TID, Nifedipine 90mg QD, Lasix 20mg QD, DM II. No hba1c. Home med Repgaglinide 1mg QD, History of kidney transplant in [place] in 2015. Patient unsure why. He follows with Dr Moreno located at [address] [place] [address]. Home meds Tacrolimus 5mg BID and Mycophenolate Mofetil 500mg BID. Not on steroids, Chronic Back Pain. History of fall in 2016 while heading to answer phone. Has been reporting deterioration in ability to ambulate and has been placing a back brace since then. He presented to the ED on 11/01 for evaluation of worsening back pain. History goes back to few months prior to presentation when the patient started complaining of worsening in his baseline back pain. Patient describes that the back pain is located along the lower aspect of the back in lumbar area. It is dull in nature, non radiating, and of around 6/10 intensity at its peak (does not wake him up at night, however). It has been occurring intermittently over the last few months: exacerbated with cold whether or upon lying supine and improved with sitting (has been sleeping in a semi-seated position for some time by now) and exertion. The pain has been limiting his daily activity, so patient decided to present to ED 11/01. Patient denies associated urinary or fecal incontinence, recent fever, or chills. On review of systems, patient reports chronic bilateral knee pain that has limited his ambulatory function but denies any recent fever, chills, night sweats, URTI symptoms (cough, rhinorrhea, sore throat), urinary symptoms (urinary frequency, urgency, intermittence, dysuria, foul smelling urine, cloudy urine), change in bowel movements (diarrhea or constipation), abdominal pain, headache, nausea, or vomiting. No sick contacts. No recent travel or exposure to recent travelers. Upon presentation to the ED, the patient was hemodynamically stable: Vital Signs in ED: /79 mmHg, HR 80 bpm, RR 18 bpm, T 97.8, SaO2 98% on RA.  - CT Lumbar Spine No Cont: No acute lumbar spine fracture or subluxation. Degenerative changes as above.    11/2:   - Patient was examined and seen at bedside. This morning he is resting comfortably in bed and reports no new issues or overnight events.   - Patient evaluated by Physical Therapy    ASSESSMENT  Worsening Back Pain  History of Chronic Back Pain  Rule Out Spinal Stenosis  * Uses back brace at home since 2016  * History of fall in 2016 while heading to answer phone. Has been reporting deterioration in ability to ambulate  Patient had MRI outpatient, he was advised with physical therapy per patient, not candidate for surgery.   Neuro exam showed no Lower extremities weakness, reflexes normal, sensation is intact. No need to repeat the LS MRI.   * CT L-spine without contrast 11/01 no acute process (fracture)  * S/P IV morphine 4mg x1 dose in ED  - Monitor back pain and keep score at 01/10  - 11/1 Physical Therapy: home w/ assist; home w/ home PT; str  Discharge home Percocet 10/325 mg q 12 hrs, Roaxin 500mg q 8hrs.       History of Renal Transplant  * Performed in [place] in 2015  * Patient unsure why  * Follows with Dr Moreno located at [address] [facility]  * Home meds Tacrolimus 5mg BID and Mycophenolate Mofetil 500mg BID. Not on prednisone  * ED BUN 14, Cr 1.0      Hypertension  Continue Lopressor 25mg TID, Nifedipine 90mg QD, Lasix 20mg QD    DM II  Continue Home med Repaglinide 1mg QD

## 2021-11-02 NOTE — PHYSICAL THERAPY INITIAL EVALUATION ADULT - PERTINENT HX OF CURRENT PROBLEM, REHAB EVAL
Pt. is a 66 year old Kinyarwanda speaking male with history of HTN, DM, kidney transplant in [place] in 2015 presented for back pain. Chronic Back Pain. History of fall in 2016 while heading to answer phone. Has been reporting deterioration in ability to ambulate and has been using a back brace since then Pt. is a 66 year old male with history of HTN, DM, kidney transplant in [place] in 2015 presented for back pain. Chronic Back Pain. History of fall in 2016 while heading to answer phone. Has been reporting deterioration in ability to ambulate and has been using a back brace since then

## 2021-11-02 NOTE — DISCHARGE NOTE NURSING/CASE MANAGEMENT/SOCIAL WORK - PATIENT PORTAL LINK FT
You can access the FollowMyHealth Patient Portal offered by [facility] by registering at the following website: [email or web address]. By joining [facility]’s FollowMyHealth portal, you will also be able to view your health information using other applications (apps) compatible with our system.

## 2021-11-02 NOTE — PROGRESS NOTE ADULT - ASSESSMENT
Case of a 66 year old male patient with multiple comorbidities including Hypertension, Diabetes Mellitus, chronic back pain since fall in 2016, and history of renal transplant in 2015 who presented to the ED on 11/01 for evaluation of worsening lower back pain, found to have no fracture on CT L-spine without contrast, to be admitted under Medicine for further evaluation, investigations, PT evaluation, and monitoring. Currently hemodynamically stable.    Worsening Back Pain  History of Chronic Back Pain  Rule Out Spinal Stenosis  Unlikely Abscess in absence of IV drug use, fever, tenderness despite being transplant patient (not on steroids)  * Not on pain meds at home  * Uses back brace at home since 2016  * History of fall in 2016 while heading to answer phone. Has been reporting deterioration in ability to ambulate  * CT L-spine without contrast 11/01 no acute process (fracture)  * S/P IV morphine 4mg x1 dose in ED  - Monitor back pain and keep score at 01/10  - Consider MRI imaging to rule out lumbar spinal stenosis since back pain improves on flexion/sitting and worsens on extension/lying supine  - Start IV morphine 2mg Q6h PRN and Tylenol PRN for pain  - 11/1 Physical Therapy: home w/ assist; home w/ home PT; str  - F/u VA Duplex LE bilateral  - Discontinued PRN morphine  - Started PRN Ibuprofen 400 mg q6hrs  - Started Robaxin 500 mg q6hrs    History of Renal Transplant  * Performed in [place] in 2015  * Patient unsure why  * Follows with Dr Moreno located at [address] [place] [address]  * Home meds Tacrolimus 5mg BID and Mycophenolate Mofetil 500mg BID. Not on prednisone  * ED BUN 14, Cr 1.0  - Consider nephrology consult   - Check tacrolimus trough and Mycophenolate levels and adjust doses accordingly  - Not on prednisone  - Trend BUN and Cr daily  - Monitor BP  - Avoid nephrotoxic agents and contrast exposure if possible    Hypertension  * Home meds Lopressor 25mg TID, Nifedipine 90mg QD, Lasix 20mg QD  * ED /79 mmHg  - Monitor BP closely  - Resume home meds: Lopressor 25mg TID, Nifedipine 90mg QD, Lasix 20mg QD    DM II  * No hba1c  * Home med Repgaglinide 1mg QD  - Check Hba1c  - Monitor POCT premeals and at bedtime  - Start Apidra sliding scale B if POCT >180-200    Others  - DVT Prophylaxis: Heparin 5000 Subcutaneously Q8h  - GI Prophylaxis: Pantoprazole 40mg PO QD  - Diet: DASH/TLC  - Code Status: Full

## 2021-11-03 LAB
COVID-19 SPIKE DOMAIN AB INTERP: POSITIVE
COVID-19 SPIKE DOMAIN ANTIBODY RESULT: >250 U/ML — HIGH
SARS-COV-2 IGG+IGM SERPL QL IA: >250 U/ML — HIGH
SARS-COV-2 IGG+IGM SERPL QL IA: POSITIVE

## 2021-11-05 LAB
MYCOPHENOLATE SERPL-MCNC: 1.1 UG/ML — SIGNIFICANT CHANGE UP (ref 1–3.5)
MYCOPHENOLIC ACID GLUCURONIDE: 31 UG/ML — SIGNIFICANT CHANGE UP (ref 15–125)

## 2021-11-09 DIAGNOSIS — Z79.84 LONG TERM (CURRENT) USE OF ORAL HYPOGLYCEMIC DRUGS: ICD-10-CM

## 2021-11-09 DIAGNOSIS — D84.821 IMMUNODEFICIENCY DUE TO DRUGS: ICD-10-CM

## 2021-11-09 DIAGNOSIS — G89.29 OTHER CHRONIC PAIN: ICD-10-CM

## 2021-11-09 DIAGNOSIS — Z79.899 OTHER LONG TERM (CURRENT) DRUG THERAPY: ICD-10-CM

## 2021-11-09 DIAGNOSIS — E11.9 TYPE 2 DIABETES MELLITUS WITHOUT COMPLICATIONS: ICD-10-CM

## 2021-11-09 DIAGNOSIS — Z91.81 HISTORY OF FALLING: ICD-10-CM

## 2021-11-09 DIAGNOSIS — Z20.822 CONTACT WITH AND (SUSPECTED) EXPOSURE TO COVID-19: ICD-10-CM

## 2021-11-09 DIAGNOSIS — M48.061 SPINAL STENOSIS, LUMBAR REGION WITHOUT NEUROGENIC CLAUDICATION: ICD-10-CM

## 2021-11-09 DIAGNOSIS — I10 ESSENTIAL (PRIMARY) HYPERTENSION: ICD-10-CM

## 2021-11-09 DIAGNOSIS — M54.50 LOW BACK PAIN, UNSPECIFIED: ICD-10-CM

## 2021-11-09 DIAGNOSIS — Z94.0 KIDNEY TRANSPLANT STATUS: ICD-10-CM

## 2021-12-21 ENCOUNTER — INPATIENT (INPATIENT)
Facility: HOSPITAL | Age: 66
LOS: 8 days | Discharge: HOME | End: 2021-12-30
Attending: INTERNAL MEDICINE | Admitting: INTERNAL MEDICINE
Payer: COMMERCIAL

## 2021-12-21 VITALS
OXYGEN SATURATION: 96 % | TEMPERATURE: 98 F | SYSTOLIC BLOOD PRESSURE: 142 MMHG | RESPIRATION RATE: 20 BRPM | HEIGHT: 67 IN | HEART RATE: 85 BPM | DIASTOLIC BLOOD PRESSURE: 69 MMHG

## 2021-12-21 PROBLEM — Z94.0 KIDNEY TRANSPLANT STATUS: Chronic | Status: ACTIVE | Noted: 2021-11-08

## 2021-12-21 PROBLEM — I10 ESSENTIAL (PRIMARY) HYPERTENSION: Chronic | Status: ACTIVE | Noted: 2021-11-08

## 2021-12-21 LAB
ALBUMIN SERPL ELPH-MCNC: 4.7 G/DL — SIGNIFICANT CHANGE UP (ref 3.5–5.2)
ALP SERPL-CCNC: 95 U/L — SIGNIFICANT CHANGE UP (ref 30–115)
ALT FLD-CCNC: 9 U/L — SIGNIFICANT CHANGE UP (ref 0–41)
ANION GAP SERPL CALC-SCNC: 26 MMOL/L — HIGH (ref 7–14)
APTT BLD: 31.1 SEC — SIGNIFICANT CHANGE UP (ref 27–39.2)
AST SERPL-CCNC: 17 U/L — SIGNIFICANT CHANGE UP (ref 0–41)
B-OH-BUTYR SERPL-SCNC: 3.8 MMOL/L — HIGH
BASE EXCESS BLDV CALC-SCNC: 7.9 MMOL/L — HIGH (ref -2–3)
BASOPHILS # BLD AUTO: 0.01 K/UL — SIGNIFICANT CHANGE UP (ref 0–0.2)
BASOPHILS NFR BLD AUTO: 0.1 % — SIGNIFICANT CHANGE UP (ref 0–1)
BILIRUB SERPL-MCNC: 0.7 MG/DL — SIGNIFICANT CHANGE UP (ref 0.2–1.2)
BLD GP AB SCN SERPL QL: SIGNIFICANT CHANGE UP
BUN SERPL-MCNC: 39 MG/DL — HIGH (ref 10–20)
CA-I SERPL-SCNC: 1.18 MMOL/L — SIGNIFICANT CHANGE UP (ref 1.15–1.33)
CALCIUM SERPL-MCNC: 9.6 MG/DL — SIGNIFICANT CHANGE UP (ref 8.5–10.1)
CHLORIDE SERPL-SCNC: 94 MMOL/L — LOW (ref 98–110)
CO2 SERPL-SCNC: 24 MMOL/L — SIGNIFICANT CHANGE UP (ref 17–32)
CREAT SERPL-MCNC: 1.6 MG/DL — HIGH (ref 0.7–1.5)
EOSINOPHIL # BLD AUTO: 0 K/UL — SIGNIFICANT CHANGE UP (ref 0–0.7)
EOSINOPHIL NFR BLD AUTO: 0 % — SIGNIFICANT CHANGE UP (ref 0–8)
GAS PNL BLDV: 139 MMOL/L — SIGNIFICANT CHANGE UP (ref 136–145)
GAS PNL BLDV: SIGNIFICANT CHANGE UP
GLUCOSE BLDC GLUCOMTR-MCNC: 389 MG/DL — HIGH (ref 70–99)
GLUCOSE SERPL-MCNC: 365 MG/DL — HIGH (ref 70–99)
HCO3 BLDV-SCNC: 35 MMOL/L — HIGH (ref 22–29)
HCT VFR BLD CALC: 37.3 % — LOW (ref 42–52)
HCT VFR BLD CALC: 41.8 % — LOW (ref 42–52)
HCT VFR BLDA CALC: 43 % — SIGNIFICANT CHANGE UP (ref 39–51)
HGB BLD CALC-MCNC: 14.2 G/DL — SIGNIFICANT CHANGE UP (ref 12.6–17.4)
HGB BLD-MCNC: 12.7 G/DL — LOW (ref 14–18)
HGB BLD-MCNC: 14.2 G/DL — SIGNIFICANT CHANGE UP (ref 14–18)
IMM GRANULOCYTES NFR BLD AUTO: 0.5 % — HIGH (ref 0.1–0.3)
INR BLD: 1.16 RATIO — SIGNIFICANT CHANGE UP (ref 0.65–1.3)
LACTATE BLDV-MCNC: 1.6 MMOL/L — SIGNIFICANT CHANGE UP (ref 0.5–2)
LYMPHOCYTES # BLD AUTO: 0.56 K/UL — LOW (ref 1.2–3.4)
LYMPHOCYTES # BLD AUTO: 5.6 % — LOW (ref 20.5–51.1)
MAGNESIUM SERPL-MCNC: 1.7 MG/DL — LOW (ref 1.8–2.4)
MCHC RBC-ENTMCNC: 31 PG — SIGNIFICANT CHANGE UP (ref 27–31)
MCHC RBC-ENTMCNC: 31.1 PG — HIGH (ref 27–31)
MCHC RBC-ENTMCNC: 34 G/DL — SIGNIFICANT CHANGE UP (ref 32–37)
MCHC RBC-ENTMCNC: 34 G/DL — SIGNIFICANT CHANGE UP (ref 32–37)
MCV RBC AUTO: 91.3 FL — SIGNIFICANT CHANGE UP (ref 80–94)
MCV RBC AUTO: 91.4 FL — SIGNIFICANT CHANGE UP (ref 80–94)
MONOCYTES # BLD AUTO: 0.21 K/UL — SIGNIFICANT CHANGE UP (ref 0.1–0.6)
MONOCYTES NFR BLD AUTO: 2.1 % — SIGNIFICANT CHANGE UP (ref 1.7–9.3)
NEUTROPHILS # BLD AUTO: 9.18 K/UL — HIGH (ref 1.4–6.5)
NEUTROPHILS NFR BLD AUTO: 91.7 % — HIGH (ref 42.2–75.2)
NRBC # BLD: 0 /100 WBCS — SIGNIFICANT CHANGE UP (ref 0–0)
NRBC # BLD: 0 /100 WBCS — SIGNIFICANT CHANGE UP (ref 0–0)
PCO2 BLDV: 56 MMHG — HIGH (ref 42–55)
PH BLDV: 7.4 — SIGNIFICANT CHANGE UP (ref 7.32–7.43)
PLATELET # BLD AUTO: 151 K/UL — SIGNIFICANT CHANGE UP (ref 130–400)
PLATELET # BLD AUTO: 165 K/UL — SIGNIFICANT CHANGE UP (ref 130–400)
PO2 BLDV: 33 MMHG — SIGNIFICANT CHANGE UP
POTASSIUM BLDV-SCNC: 3.5 MMOL/L — SIGNIFICANT CHANGE UP (ref 3.5–5.1)
POTASSIUM SERPL-MCNC: 3.7 MMOL/L — SIGNIFICANT CHANGE UP (ref 3.5–5)
POTASSIUM SERPL-SCNC: 3.7 MMOL/L — SIGNIFICANT CHANGE UP (ref 3.5–5)
PROT SERPL-MCNC: 7.3 G/DL — SIGNIFICANT CHANGE UP (ref 6–8)
PROTHROM AB SERPL-ACNC: 13.3 SEC — HIGH (ref 9.95–12.87)
RBC # BLD: 4.08 M/UL — LOW (ref 4.7–6.1)
RBC # BLD: 4.58 M/UL — LOW (ref 4.7–6.1)
RBC # FLD: 12.7 % — SIGNIFICANT CHANGE UP (ref 11.5–14.5)
RBC # FLD: 12.7 % — SIGNIFICANT CHANGE UP (ref 11.5–14.5)
SAO2 % BLDV: 57.7 % — SIGNIFICANT CHANGE UP
SARS-COV-2 RNA SPEC QL NAA+PROBE: SIGNIFICANT CHANGE UP
SODIUM SERPL-SCNC: 144 MMOL/L — SIGNIFICANT CHANGE UP (ref 135–146)
TROPONIN T SERPL-MCNC: <0.01 NG/ML — SIGNIFICANT CHANGE UP
WBC # BLD: 10.01 K/UL — SIGNIFICANT CHANGE UP (ref 4.8–10.8)
WBC # BLD: 11.93 K/UL — HIGH (ref 4.8–10.8)
WBC # FLD AUTO: 10.01 K/UL — SIGNIFICANT CHANGE UP (ref 4.8–10.8)
WBC # FLD AUTO: 11.93 K/UL — HIGH (ref 4.8–10.8)

## 2021-12-21 PROCEDURE — 99285 EMERGENCY DEPT VISIT HI MDM: CPT

## 2021-12-21 PROCEDURE — 74018 RADEX ABDOMEN 1 VIEW: CPT | Mod: 26

## 2021-12-21 PROCEDURE — 99223 1ST HOSP IP/OBS HIGH 75: CPT

## 2021-12-21 PROCEDURE — 93010 ELECTROCARDIOGRAM REPORT: CPT

## 2021-12-21 RX ORDER — METOPROLOL TARTRATE 50 MG
25 TABLET ORAL THREE TIMES A DAY
Refills: 0 | Status: DISCONTINUED | OUTPATIENT
Start: 2021-12-21 | End: 2021-12-30

## 2021-12-21 RX ORDER — NIFEDIPINE 30 MG
90 TABLET, EXTENDED RELEASE 24 HR ORAL DAILY
Refills: 0 | Status: DISCONTINUED | OUTPATIENT
Start: 2021-12-21 | End: 2021-12-28

## 2021-12-21 RX ORDER — HEPARIN SODIUM 5000 [USP'U]/ML
5000 INJECTION INTRAVENOUS; SUBCUTANEOUS EVERY 8 HOURS
Refills: 0 | Status: DISCONTINUED | OUTPATIENT
Start: 2021-12-21 | End: 2021-12-30

## 2021-12-21 RX ORDER — ONDANSETRON 8 MG/1
4 TABLET, FILM COATED ORAL ONCE
Refills: 0 | Status: COMPLETED | OUTPATIENT
Start: 2021-12-21 | End: 2021-12-21

## 2021-12-21 RX ORDER — PANTOPRAZOLE SODIUM 20 MG/1
40 TABLET, DELAYED RELEASE ORAL EVERY 12 HOURS
Refills: 0 | Status: DISCONTINUED | OUTPATIENT
Start: 2021-12-21 | End: 2021-12-22

## 2021-12-21 RX ORDER — TACROLIMUS 5 MG/1
5 CAPSULE ORAL
Refills: 0 | Status: DISCONTINUED | OUTPATIENT
Start: 2021-12-21 | End: 2021-12-30

## 2021-12-21 RX ORDER — PANTOPRAZOLE SODIUM 20 MG/1
80 TABLET, DELAYED RELEASE ORAL ONCE
Refills: 0 | Status: COMPLETED | OUTPATIENT
Start: 2021-12-21 | End: 2021-12-21

## 2021-12-21 RX ORDER — PANTOPRAZOLE SODIUM 20 MG/1
8 TABLET, DELAYED RELEASE ORAL
Qty: 80 | Refills: 0 | Status: DISCONTINUED | OUTPATIENT
Start: 2021-12-21 | End: 2021-12-21

## 2021-12-21 RX ORDER — METHOCARBAMOL 500 MG/1
500 TABLET, FILM COATED ORAL EVERY 8 HOURS
Refills: 0 | Status: DISCONTINUED | OUTPATIENT
Start: 2021-12-21 | End: 2021-12-30

## 2021-12-21 RX ORDER — FUROSEMIDE 40 MG
20 TABLET ORAL DAILY
Refills: 0 | Status: DISCONTINUED | OUTPATIENT
Start: 2021-12-21 | End: 2021-12-28

## 2021-12-21 RX ORDER — MAGNESIUM SULFATE 500 MG/ML
2 VIAL (ML) INJECTION ONCE
Refills: 0 | Status: COMPLETED | OUTPATIENT
Start: 2021-12-21 | End: 2021-12-21

## 2021-12-21 RX ORDER — SODIUM CHLORIDE 9 MG/ML
1000 INJECTION, SOLUTION INTRAVENOUS
Refills: 0 | Status: DISCONTINUED | OUTPATIENT
Start: 2021-12-21 | End: 2021-12-21

## 2021-12-21 RX ORDER — MYCOPHENOLATE MOFETIL 250 MG/1
500 CAPSULE ORAL
Refills: 0 | Status: DISCONTINUED | OUTPATIENT
Start: 2021-12-21 | End: 2021-12-30

## 2021-12-21 RX ORDER — SODIUM CHLORIDE 9 MG/ML
1000 INJECTION, SOLUTION INTRAVENOUS ONCE
Refills: 0 | Status: COMPLETED | OUTPATIENT
Start: 2021-12-21 | End: 2021-12-21

## 2021-12-21 RX ORDER — SODIUM CHLORIDE 9 MG/ML
1000 INJECTION, SOLUTION INTRAVENOUS
Refills: 0 | Status: DISCONTINUED | OUTPATIENT
Start: 2021-12-21 | End: 2021-12-24

## 2021-12-21 RX ADMIN — Medication 25 GRAM(S): at 17:56

## 2021-12-21 RX ADMIN — MYCOPHENOLATE MOFETIL 500 MILLIGRAM(S): 250 CAPSULE ORAL at 17:56

## 2021-12-21 RX ADMIN — HEPARIN SODIUM 5000 UNIT(S): 5000 INJECTION INTRAVENOUS; SUBCUTANEOUS at 22:07

## 2021-12-21 RX ADMIN — SODIUM CHLORIDE 1000 MILLILITER(S): 9 INJECTION, SOLUTION INTRAVENOUS at 20:39

## 2021-12-21 RX ADMIN — SODIUM CHLORIDE 75 MILLILITER(S): 9 INJECTION, SOLUTION INTRAVENOUS at 22:10

## 2021-12-21 RX ADMIN — TACROLIMUS 5 MILLIGRAM(S): 5 CAPSULE ORAL at 17:57

## 2021-12-21 RX ADMIN — PANTOPRAZOLE SODIUM 10 MG/HR: 20 TABLET, DELAYED RELEASE ORAL at 16:50

## 2021-12-21 RX ADMIN — METHOCARBAMOL 500 MILLIGRAM(S): 500 TABLET, FILM COATED ORAL at 22:07

## 2021-12-21 RX ADMIN — Medication 25 MILLIGRAM(S): at 22:06

## 2021-12-21 RX ADMIN — PANTOPRAZOLE SODIUM 80 MILLIGRAM(S): 20 TABLET, DELAYED RELEASE ORAL at 16:30

## 2021-12-21 RX ADMIN — ONDANSETRON 4 MILLIGRAM(S): 8 TABLET, FILM COATED ORAL at 16:00

## 2021-12-21 NOTE — H&P ADULT - NSHPLABSRESULTS_GEN_ALL_CORE
VITAL SIGNS: Last 24 Hours  T(C): 36.4 (21 Dec 2021 14:28), Max: 36.4 (21 Dec 2021 14:28)  T(F): 97.6 (21 Dec 2021 14:28), Max: 97.6 (21 Dec 2021 14:28)  HR: 85 (21 Dec 2021 14:28) (85 - 85)  BP: 142/69 (21 Dec 2021 14:28) (142/69 - 142/69)  BP(mean): --  RR: 20 (21 Dec 2021 14:28) (20 - 20)  SpO2: 96% (21 Dec 2021 14:28) (96% - 96%)    LABS:                        14.2   10.01 )-----------( 165      ( 21 Dec 2021 14:44 )             41.8     12-21    144  |  94<L>  |  39<H>  ----------------------------<  365<H>  3.7   |  24  |  1.6<H>    Ca    9.6      21 Dec 2021 14:44  Mg     1.7     12-21    TPro  7.3  /  Alb  4.7  /  TBili  0.7  /  DBili  x   /  AST  17  /  ALT  9   /  AlkPhos  95  12-21    PT/INR - ( 21 Dec 2021 14:44 )   PT: 13.30 sec;   INR: 1.16 ratio         PTT - ( 21 Dec 2021 14:44 )  PTT:31.1 sec              RADIOLOGY:

## 2021-12-21 NOTE — ED PROVIDER NOTE - OBJECTIVE STATEMENT
65 yo male, pmh of renal transplant- follows at [facility], htn, dm, presents to ed for two episodes of dark stools a/w nausea. 67 yo male, pmh of renal transplant- follows at [facility], htn, dm, presents to ed for two episodes of dark stools a/w nausea. Denies fever, chills, cp, sob, abd pain, hematuria.

## 2021-12-21 NOTE — H&P ADULT - ATTENDING COMMENTS
REVIEW OF SYSTEMS:  CONSTITUTIONAL: fevers, chills, night sweats, weight loss, +weakness,   EYES/ENT: No visual changes. No vertigo or dysphagia  NECK: No neck pain or stiffness  RESPIRATORY: No cough, wheezing, hemoptysis. No shortness of breath  CARDIOVASCULAR: No chest pain or palpitations. No lower extremity edema  GASTROINTESTINAL: No abdominal pain. + Nausea and vomiting of dark fluid   GENITOURINARY: No dysuria or hematuria   NEUROLOGICAL: No focal numbness or weakness  SKIN: No rashes or itching  HEMATOLOGIC: No easy bruising or prolonged bleeding.      PHYSICAL EXAM:  GENERAL: NAD, well-developed, Non-toxic, stated age   HEAD:  Atraumatic, Normocephalic  EYES: EOMI, Sclera White   NECK: Supple, No JVD  CHEST/LUNG: Clear to auscultation bilaterally; No wheezing, rhonchi, or crackles  HEART: Regular rate and rhythm; s1, s2, No murmurs, rubs, or gallops  ABDOMEN: Soft, Nontender, Nondistended; Bowel sounds present, No rebound or guarding noted   EXTREMITIES:  No lower extremity edema or calf tenderness to palpation.  No clubbing or cyanosis  PSYCH: AAOx3, pleasant, cooperative, not anxious  NEUROLOGY: 5/5 strength in all extremities, no downward drift. Sensation grossly intact.   SKIN: No rashes or lesions      ASSESSMENT AND PLAN:    67 yo male  pmh of renal transplant, HTN and DM presents to ed for two episodes of dark stools a/w nausea.    # Nausea and vomiting with decrease PO intake: DDx gastritis / gastroenteritis - R/O GI bleed   - patient is hemodynamically stable   - on admission: HD stable, Hg 14.2 (baseline 14.80)  - ELEUTERIO in the ED reported Dark , no evidence of active GI bleed   - active type and screen , Monitor hg, keep >7, transfuse as needed   - PPI BID for now , LR at 75 cc/hr   - will get KUB     #H/o Renal Transplant  - Home meds Tacrolimus 5mg BID and Mycophenolate Mofetil 500mg BID. Not on prednisone  - f/u nephrology   - f/u tacrolimus trough and Mycophenolate levels and adjust doses accordingly  - Monitor BUN and Cr, Avoid nephrotoxic agents and contrast exposure if possible    #DM II  - A1c 11/2/21 6.9  - Home med Repgaglinide 1mg QD  - monitor FS and start insulin if FS>180     #Hypertension -c/w  home meds Lopressor 25mg TID, Nifedipine 90mg QD, Lasix 20mg QD    dvt ppx: heparin  GI ppx: PPI BID  Clear liquid diet  From home  Full code

## 2021-12-21 NOTE — ED PROVIDER NOTE - CCCP TRG CHIEF CMPLNT
Spoke to patient regarding referrals. Patient states her pain is improving. Patient states she would like to wait for her appointment with Dr. Cornejo-Neurology and get her opinion on if a spine specialist is needed. Patient will call back with any questions, concerns or changes in status.        bloody stools

## 2021-12-21 NOTE — H&P ADULT - NSHPREVIEWOFSYSTEMS_GEN_ALL_CORE
CONSTITUTIONAL: No weakness, fevers or chills; No headaches  EYES: No visual changes, eye pain, or discharge  ENT: No vertigo; No ear pain or change in hearing; No sore throat or difficulty swallowing  NECK: No pain or stiffness  RESPIRATORY: No cough, wheezing, or hemoptysis; No shortness of breath  CARDIOVASCULAR: No chest pain or palpitations  GASTROINTESTINAL: No abdominal or epigastric pain; No nausea, vomiting, or hematemesis; No diarrhea or constipation; No melena or hematochezia  GENITOURINARY: No dysuria, frequency or hematuria  MUSCULOSKELETAL: No joint pain, no muscle pain, no weakness  NEUROLOGICAL: No numbness or weakness  SKIN: No itching or rashes CONSTITUTIONAL: No weakness, fevers or chills; No headaches  EYES: No visual changes, eye pain, or discharge  ENT: No vertigo; No ear pain or change in hearing; No sore throat or difficulty swallowing  NECK: No pain or stiffness  RESPIRATORY: No cough, wheezing, or hemoptysis; No shortness of breath  CARDIOVASCULAR: No chest pain or palpitations  GASTROINTESTINAL: + abdominal or epigastric pain; No nausea, + vomiting (dark) No diarrhea or constipation; + melena, no hematochezia  GENITOURINARY: No dysuria, frequency or hematuria  MUSCULOSKELETAL: No joint pain, no muscle pain, no weakness  NEUROLOGICAL: No numbness or weakness  SKIN: No itching or rashes

## 2021-12-21 NOTE — H&P ADULT - HISTORY OF PRESENT ILLNESS
Mr. Abad is a 66 year old (active smoker) Malay Speaking male patient known to have: Baseline: AOx3; lives with wife at home; ambulates independently unassisted; has 14 steps at home, Hypertension. Home meds Lopressor 25mg TID, Nifedipine 90mg QD, Lasix 20mg QD, DM II. No hba1c. Home med Repgaglinide 1mg QD, History of kidney transplant in [place] in 2015. Patient unsure why. He follows with Dr Moreno located at [address] [place] [address]. Home meds Tacrolimus 5mg BID and Mycophenolate Mofetil 500mg BID and Chronic Back Pain. Presented to the ED for two episodes of dark stools a/w nausea. Denies fever, chills, cp, sob, abd pain, hematuria.     On admission: T(F): 97.6, HR: 85, BP: 142/69, RR: 20, SpO2: 96%.   Mr. Abad is a 66 year old (active smoker) Y[place] Speaking male patient known to have: Baseline: AOx3; lives with wife at home; ambulates independently unassisted; has 14 steps at home, Hypertension. Home meds Lopressor 25mg TID, Nifedipine 90mg QD, Lasix 20mg QD, DM II. No hba1c. Home med Repgaglinide 1mg QD, History of kidney transplant in [place] in 2015. Patient unsure why. He follows with Dr Moreno located at [address] [place] [address]. Home meds Tacrolimus 5mg BID and Mycophenolate Mofetil 500mg BID and Chronic Back Pain. Presented to the ED for two episodes of dark stools yesterday, he also reports having 3 episodes of dark vomitus (large amount). Never had a prior episodes, denies Denies fever, chills, cp, sob, abd pain, hematuria, he is not on any AC, denies NSAID use, EtOH use. On admission: T(F): 97.6, HR: 85, BP: 142/69, RR: 20, SpO2: 96%. BUN 39 (baseline 14), Cr 1.6 ( Baseline 1.0),

## 2021-12-21 NOTE — ED PROVIDER NOTE - ATTENDING CONTRIBUTION TO CARE
67yo man h/o renal transplant followed at [facility], HTN, DM c/o dark stools x 2 associated with nausea PTA. No vomiting,no abdominal pain, no BRBPR. No prior episodes. VS, exam as noted, pt alert and cooperative, and not distressed. Lungs CTA, CSV1S2 RRR abd soft, NT, ND. Rectal per PA Vitulli with dark stool, no gross blood. Concerned for GIB, will check labs, hydrate, T&S, consult GI, reassess.

## 2021-12-21 NOTE — ED PROVIDER NOTE - NS ED ROS FT
Constitutional: (-) fever, (-) chills  Eyes: (-) visual changes  ENT: (-) nasal congestions  Cardiovascular: (-) chest pain, (-) syncope  Respiratory: (-) cough, (-) shortness of breath, (-) dyspnea,   Gastrointestinal: (-) vomiting, (-) diarrhea, (+)nausea,  Musculoskeletal: (-) neck pain, (-) back pain, (-) joint pain,  Integumentary: (-) rash, (-) edema, (-) bruises  Neurological: (-) headache, (-) loc, (-) dizziness, (-) tingling, (-)numbness,  Peripheral Vascular: (-) leg swelling  :  (-)dysuria,  (-) hematuria  Allergic/Immunologic: (-) pruritus

## 2021-12-21 NOTE — ED PROVIDER NOTE - PHYSICAL EXAMINATION
Physical Exam    Vital Signs: I have reviewed the initial vital signs.  Constitutional: well-nourished, appears stated age, no acute distress  Eyes: Conjunctiva pink, Sclera clear  Cardiovascular: S1 and S2, regular rate, regular rhythm, well-perfused extremities, radial pulses equal and 2+  Respiratory: unlabored respiratory effort, clear to auscultation bilaterally no wheezing, rales and rhonchi  Gastrointestinal: soft, non-tender abdomen, no pulsatile mass, normal bowl sounds  Rectal: dark stool in lindsey.   Musculoskeletal: supple neck, no lower extremity edema, no midline tenderness  Integumentary: warm, dry, no rash  Neurologic: awake, alert,  nvi

## 2021-12-21 NOTE — H&P ADULT - NSHPPHYSICALEXAM_GEN_ALL_CORE
CONSTITUTIONAL: No acute distress, well-developed, well-groomed, AAOx3  HEAD: Atraumatic, normocephalic  EYES: EOM intact, PERRLA, conjunctiva and sclera clear  ENT: Supple, no masses, no thyromegaly, no bruits, no JVD; moist mucous membranes  PULMONARY: Clear to auscultation bilaterally; no wheezes, rales, or rhonchi  CARDIOVASCULAR: Regular rate and rhythm; no murmurs, rubs, or gallops  GASTROINTESTINAL: Soft, non-tender, non-distended; bowel sounds present  MUSCULOSKELETAL: 2+ peripheral pulses; no clubbing, no cyanosis, no edema  NEUROLOGY: non-focal  SKIN: No rashes or lesions; warm and dry CONSTITUTIONAL: No acute distress, well-developed, well-groomed, AAOx3  HEAD: Atraumatic, normocephalic  EYES: EOM intact, PERRLA, conjunctiva and sclera clear  ENT: Supple, no masses, no thyromegaly, no bruits, no JVD; moist mucous membranes  PULMONARY: Clear to auscultation bilaterally; no wheezes, rales, or rhonchi  CARDIOVASCULAR: Regular rate and rhythm; no murmurs, rubs, or gallops  GASTROINTESTINAL: Soft, mild epigastric tenderness on palpation, non-distended; bowel sounds present  MUSCULOSKELETAL: 2+ peripheral pulses; no clubbing, no cyanosis, no edema

## 2021-12-21 NOTE — ED ADULT NURSE REASSESSMENT NOTE - NS ED NURSE REASSESS COMMENT FT1
MD # [id number] made aware about Pt glucose level.  MD will put the order for coverage. No acute distress noted.

## 2021-12-21 NOTE — ED PROVIDER NOTE - CLINICAL SUMMARY MEDICAL DECISION MAKING FREE TEXT BOX
Mild hyperglycemia and elevated BUN/Cr noted, but Hb ok so no transfusion right now. PPI started, pt remains comfortable, no vomiting or BMs in the ED. Spoke with GI, will admit for endoscopy.

## 2021-12-21 NOTE — H&P ADULT - ASSESSMENT
65 yo male, pmh of renal transplant- follows at [facility], htn, dm, presents to ed for two episodes of dark stools a/w nausea. Denies fever, chills, cp, sob, abd pain, hematuria      #Dark stools likely due to UGIB  - on admission: HD stable, Hg 14.2 (baseline 14.80)  - BUN 39 (baseline 14), Cr 1.6 (baseline 1.0), likely elevated 2/2 GIB    - on PPI drip   - active type and screen   - Monitor hg, keep >7, transfuse as needed   - GI f/u: possible EGD       #H/o Renal Transplant  - Performed in [place] in 2015, Patient unsure why  - Follows w/ [facility] and Dr Moreno located at [address] [place] [address]  - Home meds Tacrolimus 5mg BID and Mycophenolate Mofetil 500mg BID. Not on prednisone  - ED BUN 39 (baseline 14), Cr 1.6 (baseline 1.0) likely due to UGIB   - f/u nephrology   - f/u tacrolimus trough and Mycophenolate levels and adjust doses accordingly  - Monitor BUN and Cr   - Monitor BP  - Avoid nephrotoxic agents and contrast exposure if possible    #Hypertension  - /67  -c/w  home meds Lopressor 25mg TID, Nifedipine 90mg QD, Lasix 20mg QD  - Monitor BP closely      #DM II  - A1c 11/2/21 6.9  - Home med Repgaglinide 1mg QD  - FS  - SS    #Misc  - DVT Prophylaxis: Heparin subQ 5000 U TID  - Diet: carb consist/renal  - GI Prophylaxis: PPI drip  - Activity: AAT  - IV Fluids:   - Code Status: full code           67 yo male, pmh of renal transplant- follows at [facility], htn, dm, presents to ed for two episodes of dark stools a/w nausea. Denies fever, chills, cp, sob, abd pain, hematuria    #Dark stools likely due to UGIB  - on admission: HD stable, Hg 14.2 (baseline 14.80)  - BUN 39 (baseline 14), Cr 1.6 (baseline 1.0), likely elevated 2/2 GIB    - on PPI drip   - IVF   - active type and screen   - Monitor hg, keep >7, transfuse as needed   - GI f/u: possible EGD     #H/o Renal Transplant  - Performed in [place] in 2015, Patient unsure why  - Follows w/ [facility] and Dr Moreno located at [address] [place] [address]  - Home meds Tacrolimus 5mg BID and Mycophenolate Mofetil 500mg BID. Not on prednisone  - ED BUN 39 (baseline 14), Cr 1.6 (baseline 1.0) likely due to UGIB   - f/u nephrology   - f/u tacrolimus trough and Mycophenolate levels and adjust doses accordingly  - Monitor BUN and Cr   - Monitor BP  - Avoid nephrotoxic agents and contrast exposure if possible    #Hypertension  - /67  -c/w  home meds Lopressor 25mg TID, Nifedipine 90mg QD, Lasix 20mg QD  - Monitor BP closely    #DM II  - A1c 11/2/21 6.9  - Home med Repgaglinide 1mg QD  - FS  - SS    #Misc  - DVT Prophylaxis: Heparin subQ 5000 U TID  - Diet: NPO  - GI Prophylaxis: PPI drip  - Activity: AAT  - IV Fluids:   - Code Status: full code

## 2021-12-22 ENCOUNTER — RESULT REVIEW (OUTPATIENT)
Age: 66
End: 2021-12-22

## 2021-12-22 ENCOUNTER — TRANSCRIPTION ENCOUNTER (OUTPATIENT)
Age: 66
End: 2021-12-22

## 2021-12-22 LAB
A1C WITH ESTIMATED AVERAGE GLUCOSE RESULT: 7.8 % — HIGH (ref 4–5.6)
ALBUMIN SERPL ELPH-MCNC: 3.9 G/DL — SIGNIFICANT CHANGE UP (ref 3.5–5.2)
ALP SERPL-CCNC: 88 U/L — SIGNIFICANT CHANGE UP (ref 30–115)
ALT FLD-CCNC: 6 U/L — SIGNIFICANT CHANGE UP (ref 0–41)
ANION GAP SERPL CALC-SCNC: 16 MMOL/L — HIGH (ref 7–14)
AST SERPL-CCNC: 11 U/L — SIGNIFICANT CHANGE UP (ref 0–41)
BILIRUB SERPL-MCNC: 0.6 MG/DL — SIGNIFICANT CHANGE UP (ref 0.2–1.2)
BUN SERPL-MCNC: 30 MG/DL — HIGH (ref 10–20)
CALCIUM SERPL-MCNC: 8.9 MG/DL — SIGNIFICANT CHANGE UP (ref 8.5–10.1)
CHLORIDE SERPL-SCNC: 99 MMOL/L — SIGNIFICANT CHANGE UP (ref 98–110)
CO2 SERPL-SCNC: 26 MMOL/L — SIGNIFICANT CHANGE UP (ref 17–32)
CREAT SERPL-MCNC: 1.1 MG/DL — SIGNIFICANT CHANGE UP (ref 0.7–1.5)
ESTIMATED AVERAGE GLUCOSE: 177 MG/DL — HIGH (ref 68–114)
GLUCOSE BLDC GLUCOMTR-MCNC: 116 MG/DL — HIGH (ref 70–99)
GLUCOSE BLDC GLUCOMTR-MCNC: 190 MG/DL — HIGH (ref 70–99)
GLUCOSE BLDC GLUCOMTR-MCNC: 343 MG/DL — HIGH (ref 70–99)
GLUCOSE BLDC GLUCOMTR-MCNC: 63 MG/DL — LOW (ref 70–99)
GLUCOSE BLDC GLUCOMTR-MCNC: 92 MG/DL — SIGNIFICANT CHANGE UP (ref 70–99)
GLUCOSE SERPL-MCNC: 224 MG/DL — HIGH (ref 70–99)
HCT VFR BLD CALC: 38.7 % — LOW (ref 42–52)
HGB BLD-MCNC: 13.3 G/DL — LOW (ref 14–18)
MAGNESIUM SERPL-MCNC: 2 MG/DL — SIGNIFICANT CHANGE UP (ref 1.8–2.4)
MCHC RBC-ENTMCNC: 31.4 PG — HIGH (ref 27–31)
MCHC RBC-ENTMCNC: 34.4 G/DL — SIGNIFICANT CHANGE UP (ref 32–37)
MCV RBC AUTO: 91.3 FL — SIGNIFICANT CHANGE UP (ref 80–94)
NRBC # BLD: 0 /100 WBCS — SIGNIFICANT CHANGE UP (ref 0–0)
PLATELET # BLD AUTO: 151 K/UL — SIGNIFICANT CHANGE UP (ref 130–400)
POTASSIUM SERPL-MCNC: 3.1 MMOL/L — LOW (ref 3.5–5)
POTASSIUM SERPL-SCNC: 3.1 MMOL/L — LOW (ref 3.5–5)
PROT SERPL-MCNC: 6 G/DL — SIGNIFICANT CHANGE UP (ref 6–8)
RBC # BLD: 4.24 M/UL — LOW (ref 4.7–6.1)
RBC # FLD: 12.8 % — SIGNIFICANT CHANGE UP (ref 11.5–14.5)
SODIUM SERPL-SCNC: 141 MMOL/L — SIGNIFICANT CHANGE UP (ref 135–146)
TACROLIMUS SERPL-MCNC: 17.1 NG/ML — SIGNIFICANT CHANGE UP
WBC # BLD: 15.26 K/UL — HIGH (ref 4.8–10.8)
WBC # FLD AUTO: 15.26 K/UL — HIGH (ref 4.8–10.8)

## 2021-12-22 PROCEDURE — 88305 TISSUE EXAM BY PATHOLOGIST: CPT | Mod: 26

## 2021-12-22 PROCEDURE — 99232 SBSQ HOSP IP/OBS MODERATE 35: CPT | Mod: GC

## 2021-12-22 PROCEDURE — 43239 EGD BIOPSY SINGLE/MULTIPLE: CPT | Mod: 59

## 2021-12-22 PROCEDURE — 99223 1ST HOSP IP/OBS HIGH 75: CPT | Mod: 25

## 2021-12-22 PROCEDURE — 88312 SPECIAL STAINS GROUP 1: CPT | Mod: 26

## 2021-12-22 RX ORDER — INSULIN GLARGINE 100 [IU]/ML
12 INJECTION, SOLUTION SUBCUTANEOUS ONCE
Refills: 0 | Status: COMPLETED | OUTPATIENT
Start: 2021-12-22 | End: 2021-12-22

## 2021-12-22 RX ORDER — POTASSIUM CHLORIDE 20 MEQ
20 PACKET (EA) ORAL
Refills: 0 | Status: COMPLETED | OUTPATIENT
Start: 2021-12-22 | End: 2021-12-22

## 2021-12-22 RX ORDER — PANTOPRAZOLE SODIUM 20 MG/1
8 TABLET, DELAYED RELEASE ORAL
Qty: 80 | Refills: 0 | Status: DISCONTINUED | OUTPATIENT
Start: 2021-12-22 | End: 2021-12-23

## 2021-12-22 RX ORDER — INSULIN LISPRO 100/ML
VIAL (ML) SUBCUTANEOUS AT BEDTIME
Refills: 0 | Status: DISCONTINUED | OUTPATIENT
Start: 2021-12-22 | End: 2021-12-30

## 2021-12-22 RX ORDER — INSULIN GLARGINE 100 [IU]/ML
12 INJECTION, SOLUTION SUBCUTANEOUS AT BEDTIME
Refills: 0 | Status: DISCONTINUED | OUTPATIENT
Start: 2021-12-22 | End: 2021-12-26

## 2021-12-22 RX ORDER — DEXTROSE 50 % IN WATER 50 %
25 SYRINGE (ML) INTRAVENOUS ONCE
Refills: 0 | Status: DISCONTINUED | OUTPATIENT
Start: 2021-12-22 | End: 2021-12-30

## 2021-12-22 RX ORDER — INSULIN LISPRO 100/ML
4 VIAL (ML) SUBCUTANEOUS
Refills: 0 | Status: DISCONTINUED | OUTPATIENT
Start: 2021-12-22 | End: 2021-12-30

## 2021-12-22 RX ORDER — INFLUENZA VIRUS VACCINE 15; 15; 15; 15 UG/.5ML; UG/.5ML; UG/.5ML; UG/.5ML
0.7 SUSPENSION INTRAMUSCULAR ONCE
Refills: 0 | Status: COMPLETED | OUTPATIENT
Start: 2021-12-22 | End: 2021-12-22

## 2021-12-22 RX ORDER — INSULIN HUMAN 100 [IU]/ML
4 INJECTION, SOLUTION SUBCUTANEOUS ONCE
Refills: 0 | Status: COMPLETED | OUTPATIENT
Start: 2021-12-22 | End: 2021-12-22

## 2021-12-22 RX ORDER — INSULIN LISPRO 100/ML
VIAL (ML) SUBCUTANEOUS
Refills: 0 | Status: DISCONTINUED | OUTPATIENT
Start: 2021-12-22 | End: 2021-12-30

## 2021-12-22 RX ADMIN — Medication 25 MILLIGRAM(S): at 16:03

## 2021-12-22 RX ADMIN — Medication 20 MILLIEQUIVALENT(S): at 16:02

## 2021-12-22 RX ADMIN — TACROLIMUS 5 MILLIGRAM(S): 5 CAPSULE ORAL at 05:13

## 2021-12-22 RX ADMIN — Medication 4 UNIT(S): at 08:11

## 2021-12-22 RX ADMIN — Medication 4 UNIT(S): at 17:16

## 2021-12-22 RX ADMIN — METHOCARBAMOL 500 MILLIGRAM(S): 500 TABLET, FILM COATED ORAL at 22:01

## 2021-12-22 RX ADMIN — Medication 25 MILLIGRAM(S): at 05:13

## 2021-12-22 RX ADMIN — METHOCARBAMOL 500 MILLIGRAM(S): 500 TABLET, FILM COATED ORAL at 16:03

## 2021-12-22 RX ADMIN — PANTOPRAZOLE SODIUM 10 MG/HR: 20 TABLET, DELAYED RELEASE ORAL at 10:17

## 2021-12-22 RX ADMIN — INSULIN GLARGINE 12 UNIT(S): 100 INJECTION, SOLUTION SUBCUTANEOUS at 00:38

## 2021-12-22 RX ADMIN — Medication 20 MILLIEQUIVALENT(S): at 17:16

## 2021-12-22 RX ADMIN — HEPARIN SODIUM 5000 UNIT(S): 5000 INJECTION INTRAVENOUS; SUBCUTANEOUS at 16:03

## 2021-12-22 RX ADMIN — PANTOPRAZOLE SODIUM 40 MILLIGRAM(S): 20 TABLET, DELAYED RELEASE ORAL at 05:13

## 2021-12-22 RX ADMIN — INSULIN HUMAN 4 UNIT(S): 100 INJECTION, SOLUTION SUBCUTANEOUS at 01:16

## 2021-12-22 RX ADMIN — MYCOPHENOLATE MOFETIL 500 MILLIGRAM(S): 250 CAPSULE ORAL at 17:19

## 2021-12-22 RX ADMIN — Medication 1: at 08:11

## 2021-12-22 RX ADMIN — Medication 90 MILLIGRAM(S): at 05:13

## 2021-12-22 RX ADMIN — METHOCARBAMOL 500 MILLIGRAM(S): 500 TABLET, FILM COATED ORAL at 05:12

## 2021-12-22 RX ADMIN — MYCOPHENOLATE MOFETIL 500 MILLIGRAM(S): 250 CAPSULE ORAL at 05:13

## 2021-12-22 RX ADMIN — TACROLIMUS 5 MILLIGRAM(S): 5 CAPSULE ORAL at 17:16

## 2021-12-22 RX ADMIN — HEPARIN SODIUM 5000 UNIT(S): 5000 INJECTION INTRAVENOUS; SUBCUTANEOUS at 22:01

## 2021-12-22 RX ADMIN — HEPARIN SODIUM 5000 UNIT(S): 5000 INJECTION INTRAVENOUS; SUBCUTANEOUS at 05:12

## 2021-12-22 RX ADMIN — Medication 25 MILLIGRAM(S): at 22:01

## 2021-12-22 RX ADMIN — Medication 20 MILLIGRAM(S): at 05:12

## 2021-12-22 NOTE — PATIENT PROFILE ADULT - FALL HARM RISK - RISK INTERVENTIONS

## 2021-12-22 NOTE — CHART NOTE - NSCHARTNOTEFT_GEN_A_CORE
EGD findings:   Grade D esophagitis compatible with nonspecific erosive esophagitis.      Erosions in the stomach compatible with erosive gastritis. (Biopsy).      -Prominent mucosal fold was noted in the duodenum cold forceps biopsy was performed for histology. .       Plan: Clear liquid diet   C/W IV PPI drip  Await pathology results  Avoid NSAID, smoking   Repeat endoscopy in 2 months to document healing of esophagitis   will follow EGD findings:   Grade D esophagitis compatible with nonspecific erosive esophagitis.      Erosions in the stomach compatible with erosive gastritis. (Biopsy).      -Prominent mucosal fold was noted in the duodenum cold forceps biopsy was performed for histology. .       Plan: Clear liquid diet   C/W IV PPI drip  Carafate 1gm 4 times/day  Await pathology results  Avoid NSAID, smoking   Repeat endoscopy in 2 months to document healing of esophagitis   will follow

## 2021-12-22 NOTE — PROGRESS NOTE ADULT - SUBJECTIVE AND OBJECTIVE BOX
LENGTH OF HOSPITAL STAY: 1d      CHIEF COMPLAINT: Patient is a 66y old  Male who presents with a chief complaint of Dark stool (22 Dec 2021 09:44)      OVER Past 24hrs:  No acute overnight events.     HISTORY OF PRESENTING ILLNESS:   Mr. Abad is a 66 year old (active smoker) Pashto Speaking male patient known to have: Baseline: AOx3; lives with wife at home; ambulates independently unassisted; has 14 steps at home, Hypertension. Home meds Lopressor 25mg TID, Nifedipine 90mg QD, Lasix 20mg QD, DM II. No hba1c. Home med Repgaglinide 1mg QD, History of kidney transplant in [place] in 2015. Patient unsure why. He follows with Dr Moreno located at [address] [place] [address]. Home meds Tacrolimus 5mg BID and Mycophenolate Mofetil 500mg BID and Chronic Back Pain. Presented to the ED for two episodes of dark stools yesterday, he also reports having 3 episodes of dark vomitus (large amount). Never had a prior episodes, denies Denies fever, chills, cp, sob, abd pain, hematuria, he is not on any AC, denies NSAID use, EtOH use. On admission: T(F): 97.6, HR: 85, BP: 142/69, RR: 20, SpO2: 96%. BUN 39 (baseline 14), Cr 1.6 ( Baseline 1.0),    (21 Dec 2021 17:30)    PAST MEDICAL & SURGICAL HISTORY  PAST MEDICAL & SURGICAL HISTORY:  Kidney transplanted    Hypertension    No significant past surgical history          REVIEW OF SYSTEMS  Negative, except as in Physical exam    ALLERGIES:  No Known Allergies    MEDICATIONS:  STANDING MEDICATIONS  dextrose 50% Injectable 25 Gram(s) IV Push once  furosemide    Tablet 20 milliGRAM(s) Oral daily  heparin   Injectable 5000 Unit(s) SubCutaneous every 8 hours  influenza  Vaccine (HIGH DOSE) 0.7 milliLiter(s) IntraMuscular once  insulin glargine Injectable (LANTUS) 12 Unit(s) SubCutaneous at bedtime  insulin lispro (ADMELOG) corrective regimen sliding scale   SubCutaneous three times a day before meals  insulin lispro (ADMELOG) corrective regimen sliding scale   SubCutaneous at bedtime  insulin lispro Injectable (ADMELOG) 4 Unit(s) SubCutaneous three times a day before meals  lactated ringers. 1000 milliLiter(s) IV Continuous <Continuous>  methocarbamol 500 milliGRAM(s) Oral every 8 hours  metoprolol tartrate 25 milliGRAM(s) Oral three times a day  mycophenolate mofetil 500 milliGRAM(s) Oral two times a day  NIFEdipine XL 90 milliGRAM(s) Oral daily  pantoprazole Infusion 8 mG/Hr IV Continuous <Continuous>  tacrolimus 5 milliGRAM(s) Oral two times a day      PRN MEDICATIONS    VITALS:   T(F): 98.6  HR: 67  BP: 123/58  RR: 18  SpO2: 96%    PHYSICAL EXAM:  General: No acute distress.  AOx3  HEENT: Sclear in clear;  PULM: Clear to auscultation bilaterally.  CVS: RRR;   Abdomen: Soft, nondistended, nontender.  Extremities: No edema      LABS:                        13.3   15.26 )-----------( 151      ( 22 Dec 2021 06:30 )             38.7     12-22    141  |  99  |  30<H>  ----------------------------<  224<H>  3.1<L>   |  26  |  1.1    Ca    8.9      22 Dec 2021 06:30  Mg     2.0     12-22    TPro  6.0  /  Alb  3.9  /  TBili  0.6  /  DBili  x   /  AST  11  /  ALT  6   /  AlkPhos  88  12-22    PT/INR - ( 21 Dec 2021 14:44 )   PT: 13.30 sec;   INR: 1.16 ratio         PTT - ( 21 Dec 2021 14:44 )  PTT:31.1 sec      Troponin T, Serum: <0.01 ng/mL (12-21-21 @ 17:02)      CARDIAC MARKERS ( 21 Dec 2021 17:02 )  x     / <0.01 ng/mL / x     / x     / x          RADIOLOGY:    Assessment/Plan:  67 y/o M pmhx of renal transplant (On Tacrolimus & Mycophenolate), HTN, DM presents to ed for two episodes of dark stools w/ Nausea/Vomiting.     # Melena - Confirmed by ELEUTERIO (done by GI fellow); Likely UGIB;   - Hemodynamically stable; No signs of active GI bleed;   - Hb 13 (baseline 14-15); BUN 39 (baseline 14)  - Started on Protonix gtt  - Seen by GI>> EGD today(12/22);  - Keep active T&S; Monitor H&H; Transfuse>7;     # KAREEM - Most likely pre renal due to poor PO intake; Started IVF; Admission Cr. 1.6>> 1.1 (baseline 1); RESOLVED;    # Hypokalemia - Repleted; Monitor BMP;     # hx of Renal Transplant - Patient unsure why  - Home meds Tacrolimus 5mg BID and Mycophenolate Mofetil 500mg BID. Not on prednisone  > fu Tacrolimus & Mycophenolate trough  > fu Nephrology    # Essential Hypertension - Controlled; c/w Home Lopressor 25mg TID, Nifedipine 90mg Daily & Lasix 20mg Daily    # T2DM - Poorly controlled; A1c (11/2) 6.9(11/2) >> 7.8(12/22); SS for now; Add basal if FS>180; Monitor POCT;       Diet: NPO  Activity: Ambulate as Tolerated  DVT ppx: Heparin SQ  GI ppx: Protonix gtt  FULL CODE    Dispo: Acute       LENGTH OF HOSPITAL STAY: 1d      CHIEF COMPLAINT: Patient is a 66y old  Male who presents with a chief complaint of Dark stool (22 Dec 2021 09:44)      OVER Past 24hrs:  No acute overnight events.     HISTORY OF PRESENTING ILLNESS:   Mr. Abad is a 66 year old (active smoker) Tajik Speaking male patient known to have: Baseline: AOx3; lives with wife at home; ambulates independently unassisted; has 14 steps at home, Hypertension. Home meds Lopressor 25mg TID, Nifedipine 90mg QD, Lasix 20mg QD, DM II. No hba1c. Home med Repgaglinide 1mg QD, History of kidney transplant in [place] in 2015. Patient unsure why. He follows with Dr Moreno located at [address] [place] [address]. Home meds Tacrolimus 5mg BID and Mycophenolate Mofetil 500mg BID and Chronic Back Pain. Presented to the ED for two episodes of dark stools yesterday, he also reports having 3 episodes of dark vomitus (large amount). Never had a prior episodes, denies Denies fever, chills, cp, sob, abd pain, hematuria, he is not on any AC, denies NSAID use, EtOH use. On admission: T(F): 97.6, HR: 85, BP: 142/69, RR: 20, SpO2: 96%. BUN 39 (baseline 14), Cr 1.6 ( Baseline 1.0),    (21 Dec 2021 17:30)    PAST MEDICAL & SURGICAL HISTORY  PAST MEDICAL & SURGICAL HISTORY:  Kidney transplanted    Hypertension    No significant past surgical history          REVIEW OF SYSTEMS  Negative, except as in Physical exam    ALLERGIES:  No Known Allergies    MEDICATIONS:  STANDING MEDICATIONS  dextrose 50% Injectable 25 Gram(s) IV Push once  furosemide    Tablet 20 milliGRAM(s) Oral daily  heparin   Injectable 5000 Unit(s) SubCutaneous every 8 hours  influenza  Vaccine (HIGH DOSE) 0.7 milliLiter(s) IntraMuscular once  insulin glargine Injectable (LANTUS) 12 Unit(s) SubCutaneous at bedtime  insulin lispro (ADMELOG) corrective regimen sliding scale   SubCutaneous three times a day before meals  insulin lispro (ADMELOG) corrective regimen sliding scale   SubCutaneous at bedtime  insulin lispro Injectable (ADMELOG) 4 Unit(s) SubCutaneous three times a day before meals  lactated ringers. 1000 milliLiter(s) IV Continuous <Continuous>  methocarbamol 500 milliGRAM(s) Oral every 8 hours  metoprolol tartrate 25 milliGRAM(s) Oral three times a day  mycophenolate mofetil 500 milliGRAM(s) Oral two times a day  NIFEdipine XL 90 milliGRAM(s) Oral daily  pantoprazole Infusion 8 mG/Hr IV Continuous <Continuous>  tacrolimus 5 milliGRAM(s) Oral two times a day      PRN MEDICATIONS    VITALS:   T(F): 98.6  HR: 67  BP: 123/58  RR: 18  SpO2: 96%    PHYSICAL EXAM:  General: No acute distress.  AOx3  HEENT: Sclear in clear;  PULM: Clear to auscultation bilaterally.  CVS: RRR;   Abdomen: Soft, nondistended, nontender.  Extremities: No edema      LABS:                        13.3   15.26 )-----------( 151      ( 22 Dec 2021 06:30 )             38.7     12-22    141  |  99  |  30<H>  ----------------------------<  224<H>  3.1<L>   |  26  |  1.1    Ca    8.9      22 Dec 2021 06:30  Mg     2.0     12-22    TPro  6.0  /  Alb  3.9  /  TBili  0.6  /  DBili  x   /  AST  11  /  ALT  6   /  AlkPhos  88  12-22    PT/INR - ( 21 Dec 2021 14:44 )   PT: 13.30 sec;   INR: 1.16 ratio         PTT - ( 21 Dec 2021 14:44 )  PTT:31.1 sec      Troponin T, Serum: <0.01 ng/mL (12-21-21 @ 17:02)      CARDIAC MARKERS ( 21 Dec 2021 17:02 )  x     / <0.01 ng/mL / x     / x     / x          RADIOLOGY:    Assessment/Plan:  67 y/o M pmhx of renal transplant (On Tacrolimus & Mycophenolate), HTN, DM presents to ed for two episodes of dark stools w/ Nausea/Vomiting.     # Melena - Confirmed by ELEUTERIO (done by GI fellow); Likely UGIB;   - Hemodynamically stable; No signs of active GI bleed;   - Hb 13 (baseline 14-15); BUN 39 (baseline 14)  - Started on Protonix gtt  - Seen by GI>> EGD today(12/22);  - Keep active T&S; Monitor H&H; Transfuse>7;   > fu Iron def w/u  > fu GI    # KAREEM - Most likely pre renal due to poor PO intake; Started IVF; Admission Cr. 1.6>> 1.1 (baseline 1); RESOLVED;    # Hypokalemia - Repleted; Monitor BMP;     # hx of Renal Transplant - Patient unsure why  - Home meds Tacrolimus 5mg BID and Mycophenolate Mofetil 500mg BID. Not on prednisone  > fu Tacrolimus & Mycophenolate trough  > fu Nephrology    # Essential Hypertension - Controlled; c/w Home Lopressor 25mg TID, Nifedipine 90mg Daily & Lasix 20mg Daily    # T2DM - Poorly controlled; A1c (11/2) 6.9(11/2) >> 7.8(12/22); SS for now; Add basal if FS>180; Monitor POCT;       Diet: NPO  Activity: Ambulate as Tolerated  DVT ppx: Heparin SQ  GI ppx: Protonix gtt  FULL CODE    Dispo: Acute

## 2021-12-22 NOTE — PRE-ANESTHESIA EVALUATION ADULT - NSANTHOSAYNRD_GEN_A_CORE
No. GHASSAN screening performed.  STOP BANG Legend: 0-2 = LOW Risk; 3-4 = INTERMEDIATE Risk; 5-8 = HIGH Risk

## 2021-12-22 NOTE — CONSULT NOTE ADULT - SUBJECTIVE AND OBJECTIVE BOX
Gastroenterology Consultation:    Patient is a 66y old  Male who presents with a chief complaint of Dark stool (21 Dec 2021 17:30)      Admitted on: 12-21-21  HPI:  Mr. Abad is a 66 year old (active smoker) Divehi Speaking male patient known to have: Baseline: AOx3; lives with wife at home; ambulates independently unassisted; has 14 steps at home, Hypertension. Home meds Lopressor 25mg TID, Nifedipine 90mg QD, Lasix 20mg QD, DM II. No hba1c. Home med Repgaglinide 1mg QD, History of kidney transplant in [place] in 2015. Patient unsure why. He follows with Dr Moreno located at [address] [place] [address]. Home meds Tacrolimus 5mg BID and Mycophenolate Mofetil 500mg BID and Chronic Back Pain. Presented to the ED for two episodes of dark stools yesterday, he also reports having 3 episodes of dark vomitus (large amount). Never had a prior episodes, denies Denies fever, chills, cp, sob, abd pain, hematuria, he is not on any AC, denies NSAID use, EtOH use. On admission: T(F): 97.6, HR: 85, BP: 142/69, RR: 20, SpO2: 96%. BUN 39 (baseline 14), Cr 1.6 ( Baseline 1.0),    (21 Dec 2021 17:30)      Prior EGD:  Prior Colonoscopy:      PAST MEDICAL & SURGICAL HISTORY:  Kidney transplanted    Hypertension    No significant past surgical history        FAMILY HISTORY:      Social History:  Tobacco:  Alcohol:  Drugs:    Home Medications:  Lasix 20 mg oral tablet: 1 tab(s) orally once a day (01 Nov 2021 22:14)  Lopressor 50 mg oral tablet: 25 milligram(s) orally 3 times a day (01 Nov 2021 22:14)  mycophenolate mofetil 500 mg oral tablet: 1 tab(s) orally 2 times a day (01 Nov 2021 22:15)  NIFEdipine 90 mg oral tablet, extended release: 1 tab(s) orally once a day (01 Nov 2021 22:14)  repaglinide 1 mg oral tablet: 1 tab(s) orally 3 times a day (before meals) (01 Nov 2021 22:14)  tacrolimus 5 mg oral capsule: 1 cap(s) orally every 12 hours (01 Nov 2021 22:15)    MEDICATIONS  (STANDING):  dextrose 50% Injectable 25 Gram(s) IV Push once  furosemide    Tablet 20 milliGRAM(s) Oral daily  heparin   Injectable 5000 Unit(s) SubCutaneous every 8 hours  influenza  Vaccine (HIGH DOSE) 0.7 milliLiter(s) IntraMuscular once  insulin glargine Injectable (LANTUS) 12 Unit(s) SubCutaneous at bedtime  insulin lispro (ADMELOG) corrective regimen sliding scale   SubCutaneous three times a day before meals  insulin lispro (ADMELOG) corrective regimen sliding scale   SubCutaneous at bedtime  insulin lispro Injectable (ADMELOG) 4 Unit(s) SubCutaneous three times a day before meals  lactated ringers. 1000 milliLiter(s) (75 mL/Hr) IV Continuous <Continuous>  methocarbamol 500 milliGRAM(s) Oral every 8 hours  metoprolol tartrate 25 milliGRAM(s) Oral three times a day  mycophenolate mofetil 500 milliGRAM(s) Oral two times a day  NIFEdipine XL 90 milliGRAM(s) Oral daily  pantoprazole Infusion 8 mG/Hr (10 mL/Hr) IV Continuous <Continuous>  tacrolimus 5 milliGRAM(s) Oral two times a day    MEDICATIONS  (PRN):      Allergies  No Known Allergies      Review of Systems:   Constitutional:  No Fever, No Chills  ENT/Mouth:  No Hearing Changes,  No Difficulty Swallowing  Eyes:  No Eye Pain, No Vision Changes  Cardiovascular:  No Chest Pain, No Palpitations  Respiratory:  No Cough, No Dyspnea  Gastrointestinal:  As described in HPI  Musculoskeletal:  No Joint Swelling, No Back Pain  Skin:  No Skin Lesions, No Jaundice  Neuro:  No Syncope, No Dizziness  Heme/Lymph:  No Bruising, No Bleeding.          Physical Examination:  T(C): 36.1 (12-22-21 @ 07:55), Max: 36.7 (12-21-21 @ 21:00)  HR: 73 (12-22-21 @ 07:55) (73 - 85)  BP: 115/56 (12-22-21 @ 07:55) (115/56 - 147/84)  RR: 18 (12-22-21 @ 07:55) (18 - 20)  SpO2: 97% (12-21-21 @ 21:00) (96% - 97%)  Height (cm): 170.2 (12-22-21 @ 02:10)  Weight (kg): 62.1 (12-22-21 @ 02:10)    12-22-21 @ 07:01  -  12-22-21 @ 09:45  --------------------------------------------------------  IN: 0 mL / OUT: 300 mL / NET: -300 mL        Constitutional: No acute distress.  Eyes:. Conjunctivae are clear, Sclera is non-icteric.  Ears Nose and Throat: The external ears are normal appearing,  Oral mucosa is pink and moist.  Respiratory:  No signs of respiratory distress. Lung sounds are clear bilaterally.  Cardiovascular:  S1 S2, Regular rate and rhythm.  GI: Abdomen is soft, symmetric, and non-tender without distention. There are no visible lesions or scars. Bowel sounds are present and normoactive in all four quadrants. No masses, hepatomegaly, or splenomegaly are noted.   Neuro: No Tremor, No involuntary movements  Skin: No rashes, No Jaundice.          Data:                        13.3   15.26 )-----------( 151      ( 22 Dec 2021 06:30 )             38.7     Hgb Trend:  13.3  12-22-21 @ 06:30  12.7  12-21-21 @ 20:00  14.2  12-21-21 @ 14:44        12-22    141  |  99  |  30<H>  ----------------------------<  224<H>  3.1<L>   |  26  |  1.1    Ca    8.9      22 Dec 2021 06:30  Mg     2.0     12-22    TPro  6.0  /  Alb  3.9  /  TBili  0.6  /  DBili  x   /  AST  11  /  ALT  6   /  AlkPhos  88  12-22    Liver panel trend:  TBili 0.6   /   AST 11   /   ALT 6   /   AlkP 88   /   Tptn 6.0   /   Alb 3.9    /   DBili --      12-22  TBili 0.7   /   AST 17   /   ALT 9   /   AlkP 95   /   Tptn 7.3   /   Alb 4.7    /   DBili --      12-21      PT/INR - ( 21 Dec 2021 14:44 )   PT: 13.30 sec;   INR: 1.16 ratio         PTT - ( 21 Dec 2021 14:44 )  PTT:31.1 sec        Radiology:

## 2021-12-23 LAB
ALBUMIN SERPL ELPH-MCNC: 3.8 G/DL — SIGNIFICANT CHANGE UP (ref 3.5–5.2)
ALP SERPL-CCNC: 90 U/L — SIGNIFICANT CHANGE UP (ref 30–115)
ALT FLD-CCNC: 8 U/L — SIGNIFICANT CHANGE UP (ref 0–41)
ANION GAP SERPL CALC-SCNC: 14 MMOL/L — SIGNIFICANT CHANGE UP (ref 7–14)
AST SERPL-CCNC: 16 U/L — SIGNIFICANT CHANGE UP (ref 0–41)
BASOPHILS # BLD AUTO: 0.02 K/UL — SIGNIFICANT CHANGE UP (ref 0–0.2)
BASOPHILS NFR BLD AUTO: 0.2 % — SIGNIFICANT CHANGE UP (ref 0–1)
BILIRUB SERPL-MCNC: 0.7 MG/DL — SIGNIFICANT CHANGE UP (ref 0.2–1.2)
BUN SERPL-MCNC: 17 MG/DL — SIGNIFICANT CHANGE UP (ref 10–20)
CALCIUM SERPL-MCNC: 9.1 MG/DL — SIGNIFICANT CHANGE UP (ref 8.5–10.1)
CHLORIDE SERPL-SCNC: 100 MMOL/L — SIGNIFICANT CHANGE UP (ref 98–110)
CO2 SERPL-SCNC: 24 MMOL/L — SIGNIFICANT CHANGE UP (ref 17–32)
CREAT SERPL-MCNC: 0.9 MG/DL — SIGNIFICANT CHANGE UP (ref 0.7–1.5)
EOSINOPHIL # BLD AUTO: 0 K/UL — SIGNIFICANT CHANGE UP (ref 0–0.7)
EOSINOPHIL NFR BLD AUTO: 0 % — SIGNIFICANT CHANGE UP (ref 0–8)
GLUCOSE BLDC GLUCOMTR-MCNC: 103 MG/DL — HIGH (ref 70–99)
GLUCOSE BLDC GLUCOMTR-MCNC: 165 MG/DL — HIGH (ref 70–99)
GLUCOSE BLDC GLUCOMTR-MCNC: 170 MG/DL — HIGH (ref 70–99)
GLUCOSE BLDC GLUCOMTR-MCNC: 207 MG/DL — HIGH (ref 70–99)
GLUCOSE SERPL-MCNC: 95 MG/DL — SIGNIFICANT CHANGE UP (ref 70–99)
HCT VFR BLD CALC: 40.4 % — LOW (ref 42–52)
HCT VFR BLD CALC: 42.2 % — SIGNIFICANT CHANGE UP (ref 42–52)
HGB BLD-MCNC: 13.8 G/DL — LOW (ref 14–18)
HGB BLD-MCNC: 14.3 G/DL — SIGNIFICANT CHANGE UP (ref 14–18)
IMM GRANULOCYTES NFR BLD AUTO: 0.3 % — SIGNIFICANT CHANGE UP (ref 0.1–0.3)
LYMPHOCYTES # BLD AUTO: 0.95 K/UL — LOW (ref 1.2–3.4)
LYMPHOCYTES # BLD AUTO: 8.8 % — LOW (ref 20.5–51.1)
MAGNESIUM SERPL-MCNC: 1.7 MG/DL — LOW (ref 1.8–2.4)
MCHC RBC-ENTMCNC: 31 PG — SIGNIFICANT CHANGE UP (ref 27–31)
MCHC RBC-ENTMCNC: 31.2 PG — HIGH (ref 27–31)
MCHC RBC-ENTMCNC: 33.9 G/DL — SIGNIFICANT CHANGE UP (ref 32–37)
MCHC RBC-ENTMCNC: 34.2 G/DL — SIGNIFICANT CHANGE UP (ref 32–37)
MCV RBC AUTO: 90.8 FL — SIGNIFICANT CHANGE UP (ref 80–94)
MCV RBC AUTO: 92.1 FL — SIGNIFICANT CHANGE UP (ref 80–94)
MONOCYTES # BLD AUTO: 0.71 K/UL — HIGH (ref 0.1–0.6)
MONOCYTES NFR BLD AUTO: 6.6 % — SIGNIFICANT CHANGE UP (ref 1.7–9.3)
NEUTROPHILS # BLD AUTO: 9.03 K/UL — HIGH (ref 1.4–6.5)
NEUTROPHILS NFR BLD AUTO: 84.1 % — HIGH (ref 42.2–75.2)
NRBC # BLD: 0 /100 WBCS — SIGNIFICANT CHANGE UP (ref 0–0)
NRBC # BLD: 0 /100 WBCS — SIGNIFICANT CHANGE UP (ref 0–0)
PLATELET # BLD AUTO: 142 K/UL — SIGNIFICANT CHANGE UP (ref 130–400)
PLATELET # BLD AUTO: 148 K/UL — SIGNIFICANT CHANGE UP (ref 130–400)
POTASSIUM SERPL-MCNC: 3.5 MMOL/L — SIGNIFICANT CHANGE UP (ref 3.5–5)
POTASSIUM SERPL-SCNC: 3.5 MMOL/L — SIGNIFICANT CHANGE UP (ref 3.5–5)
PROT SERPL-MCNC: 6.3 G/DL — SIGNIFICANT CHANGE UP (ref 6–8)
RBC # BLD: 4.45 M/UL — LOW (ref 4.7–6.1)
RBC # BLD: 4.58 M/UL — LOW (ref 4.7–6.1)
RBC # FLD: 12.4 % — SIGNIFICANT CHANGE UP (ref 11.5–14.5)
RBC # FLD: 12.7 % — SIGNIFICANT CHANGE UP (ref 11.5–14.5)
SODIUM SERPL-SCNC: 138 MMOL/L — SIGNIFICANT CHANGE UP (ref 135–146)
WBC # BLD: 10.74 K/UL — SIGNIFICANT CHANGE UP (ref 4.8–10.8)
WBC # BLD: 9.06 K/UL — SIGNIFICANT CHANGE UP (ref 4.8–10.8)
WBC # FLD AUTO: 10.74 K/UL — SIGNIFICANT CHANGE UP (ref 4.8–10.8)
WBC # FLD AUTO: 9.06 K/UL — SIGNIFICANT CHANGE UP (ref 4.8–10.8)

## 2021-12-23 PROCEDURE — 99233 SBSQ HOSP IP/OBS HIGH 50: CPT

## 2021-12-23 PROCEDURE — 99232 SBSQ HOSP IP/OBS MODERATE 35: CPT | Mod: GC

## 2021-12-23 RX ORDER — SUCRALFATE 1 G
1 TABLET ORAL
Refills: 0 | Status: DISCONTINUED | OUTPATIENT
Start: 2021-12-23 | End: 2021-12-30

## 2021-12-23 RX ORDER — MAGNESIUM SULFATE 500 MG/ML
2 VIAL (ML) INJECTION ONCE
Refills: 0 | Status: COMPLETED | OUTPATIENT
Start: 2021-12-23 | End: 2021-12-23

## 2021-12-23 RX ORDER — PANTOPRAZOLE SODIUM 20 MG/1
40 TABLET, DELAYED RELEASE ORAL
Refills: 0 | Status: DISCONTINUED | OUTPATIENT
Start: 2021-12-23 | End: 2021-12-24

## 2021-12-23 RX ORDER — LANOLIN ALCOHOL/MO/W.PET/CERES
5 CREAM (GRAM) TOPICAL AT BEDTIME
Refills: 0 | Status: DISCONTINUED | OUTPATIENT
Start: 2021-12-23 | End: 2021-12-30

## 2021-12-23 RX ADMIN — Medication 1: at 17:06

## 2021-12-23 RX ADMIN — Medication 25 MILLIGRAM(S): at 17:05

## 2021-12-23 RX ADMIN — Medication 1 GRAM(S): at 17:08

## 2021-12-23 RX ADMIN — MYCOPHENOLATE MOFETIL 500 MILLIGRAM(S): 250 CAPSULE ORAL at 05:07

## 2021-12-23 RX ADMIN — Medication 4 UNIT(S): at 17:06

## 2021-12-23 RX ADMIN — Medication 2: at 11:22

## 2021-12-23 RX ADMIN — Medication 25 MILLIGRAM(S): at 22:03

## 2021-12-23 RX ADMIN — HEPARIN SODIUM 5000 UNIT(S): 5000 INJECTION INTRAVENOUS; SUBCUTANEOUS at 17:01

## 2021-12-23 RX ADMIN — Medication 1 GRAM(S): at 23:40

## 2021-12-23 RX ADMIN — METHOCARBAMOL 500 MILLIGRAM(S): 500 TABLET, FILM COATED ORAL at 05:08

## 2021-12-23 RX ADMIN — Medication 25 MILLIGRAM(S): at 05:07

## 2021-12-23 RX ADMIN — Medication 20 MILLIGRAM(S): at 05:07

## 2021-12-23 RX ADMIN — HEPARIN SODIUM 5000 UNIT(S): 5000 INJECTION INTRAVENOUS; SUBCUTANEOUS at 05:08

## 2021-12-23 RX ADMIN — Medication 1 GRAM(S): at 11:57

## 2021-12-23 RX ADMIN — METHOCARBAMOL 500 MILLIGRAM(S): 500 TABLET, FILM COATED ORAL at 17:04

## 2021-12-23 RX ADMIN — PANTOPRAZOLE SODIUM 40 MILLIGRAM(S): 20 TABLET, DELAYED RELEASE ORAL at 17:09

## 2021-12-23 RX ADMIN — INSULIN GLARGINE 12 UNIT(S): 100 INJECTION, SOLUTION SUBCUTANEOUS at 22:03

## 2021-12-23 RX ADMIN — MYCOPHENOLATE MOFETIL 500 MILLIGRAM(S): 250 CAPSULE ORAL at 17:07

## 2021-12-23 RX ADMIN — SODIUM CHLORIDE 75 MILLILITER(S): 9 INJECTION, SOLUTION INTRAVENOUS at 11:57

## 2021-12-23 RX ADMIN — Medication 4 UNIT(S): at 11:23

## 2021-12-23 RX ADMIN — Medication 5 MILLIGRAM(S): at 22:03

## 2021-12-23 RX ADMIN — HEPARIN SODIUM 5000 UNIT(S): 5000 INJECTION INTRAVENOUS; SUBCUTANEOUS at 22:03

## 2021-12-23 RX ADMIN — TACROLIMUS 5 MILLIGRAM(S): 5 CAPSULE ORAL at 05:08

## 2021-12-23 RX ADMIN — METHOCARBAMOL 500 MILLIGRAM(S): 500 TABLET, FILM COATED ORAL at 22:03

## 2021-12-23 RX ADMIN — TACROLIMUS 5 MILLIGRAM(S): 5 CAPSULE ORAL at 17:08

## 2021-12-23 RX ADMIN — Medication 90 MILLIGRAM(S): at 05:07

## 2021-12-23 RX ADMIN — Medication 25 GRAM(S): at 17:10

## 2021-12-23 NOTE — PROGRESS NOTE ADULT - SUBJECTIVE AND OBJECTIVE BOX
Gastroenterology progress note:     Patient is a 66y old  Male who presents with a chief complaint of Dark stool (23 Dec 2021 11:33)       Admitted on: 12-21-21    We are following the patient for melena     Interval History:  Still having nausea, had one episode of vomiting this AM  No BM   passing gas       PAST MEDICAL & SURGICAL HISTORY:  Kidney transplanted    Hypertension    DMII (diabetes mellitus, type 2)    No significant past surgical history        MEDICATIONS  (STANDING):  dextrose 50% Injectable 25 Gram(s) IV Push once  furosemide    Tablet 20 milliGRAM(s) Oral daily  heparin   Injectable 5000 Unit(s) SubCutaneous every 8 hours  influenza  Vaccine (HIGH DOSE) 0.7 milliLiter(s) IntraMuscular once  insulin glargine Injectable (LANTUS) 12 Unit(s) SubCutaneous at bedtime  insulin lispro (ADMELOG) corrective regimen sliding scale   SubCutaneous three times a day before meals  insulin lispro (ADMELOG) corrective regimen sliding scale   SubCutaneous at bedtime  insulin lispro Injectable (ADMELOG) 4 Unit(s) SubCutaneous three times a day before meals  lactated ringers. 1000 milliLiter(s) (75 mL/Hr) IV Continuous <Continuous>  methocarbamol 500 milliGRAM(s) Oral every 8 hours  metoprolol tartrate 25 milliGRAM(s) Oral three times a day  mycophenolate mofetil 500 milliGRAM(s) Oral two times a day  NIFEdipine XL 90 milliGRAM(s) Oral daily  pantoprazole  Injectable 40 milliGRAM(s) IV Push two times a day  sucralfate suspension 1 Gram(s) Oral four times a day  tacrolimus 5 milliGRAM(s) Oral two times a day    MEDICATIONS  (PRN):      Allergies  No Known Allergies      Review of Systems:   Cardiovascular:  No Chest Pain, No Palpitations  Respiratory:  No Cough, No Dyspnea  Gastrointestinal:  As described in HPI    Physical Examination:  T(C): 37 (12-23-21 @ 07:00), Max: 37 (12-22-21 @ 13:12)  HR: 80 (12-23-21 @ 07:00) (64 - 88)  BP: 153/72 (12-23-21 @ 07:00) (122/58 - 153/72)  RR: 18 (12-23-21 @ 07:00) (16 - 18)  SpO2: 94% (12-22-21 @ 15:31) (94% - 97%)  Weight (kg): 62.1 (12-22-21 @ 13:12)    12-22-21 @ 07:01  -  12-23-21 @ 07:00  --------------------------------------------------------  IN: 0 mL / OUT: 600 mL / NET: -600 mL    12-23-21 @ 07:01  -  12-23-21 @ 12:54  --------------------------------------------------------  IN: 150 mL / OUT: 750 mL / NET: -600 mL      Constitutional: No acute distress.  Respiratory:  No signs of respiratory distress. Lung sounds are clear bilaterally.  Cardiovascular:  S1 S2, Regular rate and rhythm.  Abdominal: Abdomen is soft, symmetric, and non-tender without distention. There are no visible lesions or scars. Bowel sounds are present and normoactive in all four quadrants. No masses, hepatomegaly, or splenomegaly are noted.   Skin: No rashes, No Jaundice.        Data:                        14.3   10.74 )-----------( 148      ( 23 Dec 2021 04:30 )             42.2     Hgb trend:  14.3  12-23-21 @ 04:30  13.3  12-22-21 @ 06:30  12.7  12-21-21 @ 20:00  14.2  12-21-21 @ 14:44        12-23    138  |  100  |  17  ----------------------------<  95  3.5   |  24  |  0.9    Ca    9.1      23 Dec 2021 04:30  Mg     1.7     12-23    TPro  6.3  /  Alb  3.8  /  TBili  0.7  /  DBili  x   /  AST  16  /  ALT  8   /  AlkPhos  90  12-23    Liver panel trend:  TBili 0.7   /   AST 16   /   ALT 8   /   AlkP 90   /   Tptn 6.3   /   Alb 3.8    /   DBili --      12-23  TBili 0.6   /   AST 11   /   ALT 6   /   AlkP 88   /   Tptn 6.0   /   Alb 3.9    /   DBili --      12-22  TBili 0.7   /   AST 17   /   ALT 9   /   AlkP 95   /   Tptn 7.3   /   Alb 4.7    /   DBili --      12-21      PT/INR - ( 21 Dec 2021 14:44 )   PT: 13.30 sec;   INR: 1.16 ratio         PTT - ( 21 Dec 2021 14:44 )  PTT:31.1 sec       Radiology:

## 2021-12-23 NOTE — PROGRESS NOTE ADULT - SUBJECTIVE AND OBJECTIVE BOX
FELICIANO JEFFERY 66y Male  MRN#: [id number]   CODE STATUS: full       SUBJECTIVE  Patient is a 66y old Male who presents with a chief complaint of Dark stool (22 Dec 2021 13:04)  Currently admitted to medicine with the primary diagnosis of Dark stools    Today is hospital day 2d, and this morning he is resting comfortably in bed   No overnight events.       OBJECTIVE  PAST MEDICAL & SURGICAL HISTORY  Kidney transplanted    Hypertension    DMII (diabetes mellitus, type 2)    No significant past surgical history      ALLERGIES:  No Known Allergies    MEDICATIONS:  STANDING MEDICATIONS  dextrose 50% Injectable 25 Gram(s) IV Push once  furosemide    Tablet 20 milliGRAM(s) Oral daily  heparin   Injectable 5000 Unit(s) SubCutaneous every 8 hours  influenza  Vaccine (HIGH DOSE) 0.7 milliLiter(s) IntraMuscular once  insulin glargine Injectable (LANTUS) 12 Unit(s) SubCutaneous at bedtime  insulin lispro (ADMELOG) corrective regimen sliding scale   SubCutaneous three times a day before meals  insulin lispro (ADMELOG) corrective regimen sliding scale   SubCutaneous at bedtime  insulin lispro Injectable (ADMELOG) 4 Unit(s) SubCutaneous three times a day before meals  lactated ringers. 1000 milliLiter(s) IV Continuous <Continuous>  methocarbamol 500 milliGRAM(s) Oral every 8 hours  metoprolol tartrate 25 milliGRAM(s) Oral three times a day  mycophenolate mofetil 500 milliGRAM(s) Oral two times a day  NIFEdipine XL 90 milliGRAM(s) Oral daily  pantoprazole  Injectable 40 milliGRAM(s) IV Push two times a day  sucralfate suspension 1 Gram(s) Oral four times a day  tacrolimus 5 milliGRAM(s) Oral two times a day    PRN MEDICATIONS      VITAL SIGNS: Last 24 Hours  T(C): 37 (23 Dec 2021 07:00), Max: 37 (22 Dec 2021 12:37)  T(F): 98.6 (23 Dec 2021 07:00), Max: 98.6 (22 Dec 2021 12:37)  HR: 80 (23 Dec 2021 07:00) (64 - 88)  BP: 153/72 (23 Dec 2021 07:00) (122/58 - 153/72)  BP(mean): --  RR: 18 (23 Dec 2021 07:00) (16 - 18)  SpO2: 94% (22 Dec 2021 15:31) (94% - 97%)    LABS:                        14.3   10.74 )-----------( 148      ( 23 Dec 2021 04:30 )             42.2     12-23    138  |  100  |  17  ----------------------------<  95  3.5   |  24  |  0.9    Ca    9.1      23 Dec 2021 04:30  Mg     1.7     12-23    TPro  6.3  /  Alb  3.8  /  TBili  0.7  /  DBili  x   /  AST  16  /  ALT  8   /  AlkPhos  90  12-23    PT/INR - ( 21 Dec 2021 14:44 )   PT: 13.30 sec;   INR: 1.16 ratio       PTT - ( 21 Dec 2021 14:44 )  PTT:31.1 sec    CARDIAC MARKERS ( 21 Dec 2021 17:02 )  x     / <0.01 ng/mL / x     / x     / x            PHYSICAL EXAM:  GENERAL: NAD, well-developed, resting comfortably in bed  HEENT:  Atraumatic, Normocephalic. EOMI, conjunctiva and sclera clear, No JVD  PULMONARY: Clear to auscultation bilaterally; No wheeze  CARDIOVASCULAR: Regular rate and rhythm; No murmurs, rubs, or gallops  GASTROINTESTINAL: Soft, Nontender, Nondistended; Bowel sounds present  MUSCULOSKELETAL: No clubbing, cyanosis, or edema  NEUROLOGY: non-focal  SKIN: No rashes or lesions      ASSESSMENT & PLAN  67 y/o M pmhx of renal transplant (On Tacrolimus & Mycophenolate), HTN, DM presents to ed for two episodes of dark stools w/ Nausea/Vomiting.     # Dark stools with nausea and vomiting   - ELEUTERIO pos for Melena in ED   - Hemodynamically stable; No signs of active GI bleed;   - Hb 13 (baseline 14-15); BUN 39 (baseline 14), Keep active T&S; Monitor H&H; Transfuse>7;   - EGD 12/22 Grade D esophagitis compatible with nonspecific erosive esophagitis.Erosions in the stomach compatible with erosive gastritis. (Biopsy). Prominent mucosal fold was noted in the duodenum cold forceps biopsy was performed for histology  - s/p protonix gtt -> start PPI IV bid today, cont carafate 1gm qid  - clear liquid diet   - F/u GI for repeat endoscopy in 2 months     # KAREEM - Most likely pre renal due to poor PO intake - resolved   - improved with IVF  - Admission Cr. 1.6>> 1.1 (baseline 1)    # Hypokalemia - Repleted; Monitor BMP;     # hx of Renal Transplant - Patient unsure why  - cont home medsTacrolimus 5mg BID and Mycophenolate Mofetil 500mg BID. Not on prednisone  - tacrolimus level 17.1   - fu Nephrology    # HTN; c/w Home Lopressor 25mg TID, Nifedipine 90mg Daily & Lasix 20mg Daily  # T2DM - Poorly controlled;- a1c 7.8(12/22); lantus 12, lispro 4, ISS     Diet: clears   Activity: Ambulate as Tolerated  DVT ppx: Heparin SQ  GI ppx: Protonix IV bid   FULL CODE

## 2021-12-23 NOTE — PROGRESS NOTE ADULT - ASSESSMENT
65 y/o M pmhx of renal transplant (On Tacrolimus & Mycophenolate), HTN, DM presents to ed for two episodes of dark stools w/ Nausea/Vomiting.     #)Melena/Coffee ground emesis Upper GI Bleed likely sec to severe esophagitis likely secondary to tacrolimus and mycophenolate mofetil  s/p EGD 12/22 LA grade D esophagitis, erosive gastritis  Hemodynamically stable   Basline Hb 14-15 admitted with 14 dropped to 12.7  No BM overnight  Having nausea, vomiting    Rec:  clear liquid diet  Maintain active type and screen.  Trend CBC once a day  IV Pantoprazole drip   carafate 1 gm 4 times/day   Await pathology results  Avoid NSAID, smoking   Need Repeat endoscopy in 2 months to document healing of esophagitis   Esophagitis ?from tacrolimus >10% ulcerative esophagitis, Mycophenolate mofetil 3 to 20% of esophagitis  Please touch base with nephrology about possible side effects of medications either change in dosage or alternatives  continue with clear liquid diet and advance slowly if tolerated ; if tolerating diet can be discharged to home

## 2021-12-24 LAB
ALBUMIN SERPL ELPH-MCNC: 3.6 G/DL — SIGNIFICANT CHANGE UP (ref 3.5–5.2)
ALP SERPL-CCNC: 83 U/L — SIGNIFICANT CHANGE UP (ref 30–115)
ALT FLD-CCNC: 7 U/L — SIGNIFICANT CHANGE UP (ref 0–41)
ANION GAP SERPL CALC-SCNC: 17 MMOL/L — HIGH (ref 7–14)
AST SERPL-CCNC: 14 U/L — SIGNIFICANT CHANGE UP (ref 0–41)
BASOPHILS # BLD AUTO: 0.02 K/UL — SIGNIFICANT CHANGE UP (ref 0–0.2)
BASOPHILS NFR BLD AUTO: 0.3 % — SIGNIFICANT CHANGE UP (ref 0–1)
BILIRUB SERPL-MCNC: 0.8 MG/DL — SIGNIFICANT CHANGE UP (ref 0.2–1.2)
BUN SERPL-MCNC: 12 MG/DL — SIGNIFICANT CHANGE UP (ref 10–20)
CALCIUM SERPL-MCNC: 8.8 MG/DL — SIGNIFICANT CHANGE UP (ref 8.5–10.1)
CHLORIDE SERPL-SCNC: 99 MMOL/L — SIGNIFICANT CHANGE UP (ref 98–110)
CO2 SERPL-SCNC: 25 MMOL/L — SIGNIFICANT CHANGE UP (ref 17–32)
CREAT SERPL-MCNC: 0.9 MG/DL — SIGNIFICANT CHANGE UP (ref 0.7–1.5)
EOSINOPHIL # BLD AUTO: 0.03 K/UL — SIGNIFICANT CHANGE UP (ref 0–0.7)
EOSINOPHIL NFR BLD AUTO: 0.4 % — SIGNIFICANT CHANGE UP (ref 0–8)
FERRITIN SERPL-MCNC: 140 NG/ML — SIGNIFICANT CHANGE UP (ref 30–400)
GLUCOSE BLDC GLUCOMTR-MCNC: 108 MG/DL — HIGH (ref 70–99)
GLUCOSE BLDC GLUCOMTR-MCNC: 146 MG/DL — HIGH (ref 70–99)
GLUCOSE BLDC GLUCOMTR-MCNC: 177 MG/DL — HIGH (ref 70–99)
GLUCOSE BLDC GLUCOMTR-MCNC: 196 MG/DL — HIGH (ref 70–99)
GLUCOSE SERPL-MCNC: 90 MG/DL — SIGNIFICANT CHANGE UP (ref 70–99)
HCT VFR BLD CALC: 43.2 % — SIGNIFICANT CHANGE UP (ref 42–52)
HGB BLD-MCNC: 14.6 G/DL — SIGNIFICANT CHANGE UP (ref 14–18)
IMM GRANULOCYTES NFR BLD AUTO: 0.3 % — SIGNIFICANT CHANGE UP (ref 0.1–0.3)
LYMPHOCYTES # BLD AUTO: 1.52 K/UL — SIGNIFICANT CHANGE UP (ref 1.2–3.4)
LYMPHOCYTES # BLD AUTO: 19.1 % — LOW (ref 20.5–51.1)
MAGNESIUM SERPL-MCNC: 1.8 MG/DL — SIGNIFICANT CHANGE UP (ref 1.8–2.4)
MCHC RBC-ENTMCNC: 30.6 PG — SIGNIFICANT CHANGE UP (ref 27–31)
MCHC RBC-ENTMCNC: 33.8 G/DL — SIGNIFICANT CHANGE UP (ref 32–37)
MCV RBC AUTO: 90.6 FL — SIGNIFICANT CHANGE UP (ref 80–94)
MONOCYTES # BLD AUTO: 0.78 K/UL — HIGH (ref 0.1–0.6)
MONOCYTES NFR BLD AUTO: 9.8 % — HIGH (ref 1.7–9.3)
NEUTROPHILS # BLD AUTO: 5.57 K/UL — SIGNIFICANT CHANGE UP (ref 1.4–6.5)
NEUTROPHILS NFR BLD AUTO: 70.1 % — SIGNIFICANT CHANGE UP (ref 42.2–75.2)
NRBC # BLD: 0 /100 WBCS — SIGNIFICANT CHANGE UP (ref 0–0)
PLATELET # BLD AUTO: 147 K/UL — SIGNIFICANT CHANGE UP (ref 130–400)
POTASSIUM SERPL-MCNC: 3.4 MMOL/L — LOW (ref 3.5–5)
POTASSIUM SERPL-SCNC: 3.4 MMOL/L — LOW (ref 3.5–5)
PROT SERPL-MCNC: 6.1 G/DL — SIGNIFICANT CHANGE UP (ref 6–8)
RBC # BLD: 4.77 M/UL — SIGNIFICANT CHANGE UP (ref 4.7–6.1)
RBC # FLD: 12.3 % — SIGNIFICANT CHANGE UP (ref 11.5–14.5)
SODIUM SERPL-SCNC: 141 MMOL/L — SIGNIFICANT CHANGE UP (ref 135–146)
WBC # BLD: 7.94 K/UL — SIGNIFICANT CHANGE UP (ref 4.8–10.8)
WBC # FLD AUTO: 7.94 K/UL — SIGNIFICANT CHANGE UP (ref 4.8–10.8)

## 2021-12-24 PROCEDURE — 99232 SBSQ HOSP IP/OBS MODERATE 35: CPT

## 2021-12-24 RX ORDER — PANTOPRAZOLE SODIUM 20 MG/1
40 TABLET, DELAYED RELEASE ORAL ONCE
Refills: 0 | Status: COMPLETED | OUTPATIENT
Start: 2021-12-24 | End: 2021-12-24

## 2021-12-24 RX ORDER — PANTOPRAZOLE SODIUM 20 MG/1
8 TABLET, DELAYED RELEASE ORAL
Qty: 80 | Refills: 0 | Status: DISCONTINUED | OUTPATIENT
Start: 2021-12-24 | End: 2021-12-25

## 2021-12-24 RX ADMIN — MYCOPHENOLATE MOFETIL 500 MILLIGRAM(S): 250 CAPSULE ORAL at 18:40

## 2021-12-24 RX ADMIN — HEPARIN SODIUM 5000 UNIT(S): 5000 INJECTION INTRAVENOUS; SUBCUTANEOUS at 13:21

## 2021-12-24 RX ADMIN — PANTOPRAZOLE SODIUM 40 MILLIGRAM(S): 20 TABLET, DELAYED RELEASE ORAL at 05:32

## 2021-12-24 RX ADMIN — Medication 20 MILLIGRAM(S): at 05:33

## 2021-12-24 RX ADMIN — Medication 4 UNIT(S): at 12:02

## 2021-12-24 RX ADMIN — METHOCARBAMOL 500 MILLIGRAM(S): 500 TABLET, FILM COATED ORAL at 21:35

## 2021-12-24 RX ADMIN — TACROLIMUS 5 MILLIGRAM(S): 5 CAPSULE ORAL at 05:33

## 2021-12-24 RX ADMIN — Medication 1 GRAM(S): at 05:33

## 2021-12-24 RX ADMIN — INSULIN GLARGINE 12 UNIT(S): 100 INJECTION, SOLUTION SUBCUTANEOUS at 21:35

## 2021-12-24 RX ADMIN — Medication 25 MILLIGRAM(S): at 05:33

## 2021-12-24 RX ADMIN — Medication 1: at 12:02

## 2021-12-24 RX ADMIN — Medication 90 MILLIGRAM(S): at 05:33

## 2021-12-24 RX ADMIN — Medication 1 GRAM(S): at 23:32

## 2021-12-24 RX ADMIN — Medication 25 MILLIGRAM(S): at 13:20

## 2021-12-24 RX ADMIN — Medication 25 MILLIGRAM(S): at 21:35

## 2021-12-24 RX ADMIN — PANTOPRAZOLE SODIUM 10 MG/HR: 20 TABLET, DELAYED RELEASE ORAL at 13:17

## 2021-12-24 RX ADMIN — HEPARIN SODIUM 5000 UNIT(S): 5000 INJECTION INTRAVENOUS; SUBCUTANEOUS at 05:32

## 2021-12-24 RX ADMIN — Medication 4 UNIT(S): at 17:09

## 2021-12-24 RX ADMIN — PANTOPRAZOLE SODIUM 40 MILLIGRAM(S): 20 TABLET, DELAYED RELEASE ORAL at 08:47

## 2021-12-24 RX ADMIN — SODIUM CHLORIDE 75 MILLILITER(S): 9 INJECTION, SOLUTION INTRAVENOUS at 13:16

## 2021-12-24 RX ADMIN — HEPARIN SODIUM 5000 UNIT(S): 5000 INJECTION INTRAVENOUS; SUBCUTANEOUS at 21:35

## 2021-12-24 RX ADMIN — Medication 1: at 17:09

## 2021-12-24 RX ADMIN — METHOCARBAMOL 500 MILLIGRAM(S): 500 TABLET, FILM COATED ORAL at 13:21

## 2021-12-24 RX ADMIN — METHOCARBAMOL 500 MILLIGRAM(S): 500 TABLET, FILM COATED ORAL at 05:33

## 2021-12-24 RX ADMIN — TACROLIMUS 5 MILLIGRAM(S): 5 CAPSULE ORAL at 17:10

## 2021-12-24 RX ADMIN — Medication 5 MILLIGRAM(S): at 21:35

## 2021-12-24 RX ADMIN — Medication 1 GRAM(S): at 12:03

## 2021-12-24 RX ADMIN — Medication 1 GRAM(S): at 17:10

## 2021-12-24 RX ADMIN — MYCOPHENOLATE MOFETIL 500 MILLIGRAM(S): 250 CAPSULE ORAL at 05:33

## 2021-12-24 NOTE — PROGRESS NOTE ADULT - ASSESSMENT
65 yo male  pmh of renal transplant, HTN and DM presents to ed for two episodes of dark stools a/w nausea.    # Nausea and vomiting with decrease PO intake, melena. Esophagitis, most likely induced by medications.  - EGD done 12/22: Grade D esophagitis compatible with nonspecific erosive esophagitis.      Erosions in the stomach compatible with erosive gastritis. (Biopsy).      -Prominent mucosal fold was noted in the duodenum cold forceps biopsy was performed for histology. .    - cont PPI gtt, landonley tomorrow will transition to IV BID  - cont Carafate  - cont liquid diet  - avoid NSAIDs, smoking cessation    - f/u with GI biopsy results as OP  - Hb stable  - no transfusion on this admission     # H/o Renal Transplant  - Home meds Tacrolimus 5mg BID and Mycophenolate Mofetil 500mg BID. Not on prednisone  - f/u nephrology   - tacrolimus trough and Mycophenolate levels WNL   - Monitor BUN and Cr, Avoid nephrotoxic agents and contrast exposure if possible    #DM II  - A1c 11/2/21 6.9  - Home med Repgaglinide 1mg QD  - monitor FS and start insulin if FS>180     # Hypertension -c/w  home meds Lopressor 25mg TID, Nifedipine 90mg QD, Lasix 20mg QD    DVT ppx Heparin SQ

## 2021-12-24 NOTE — PROGRESS NOTE ADULT - SUBJECTIVE AND OBJECTIVE BOX
Gastroenterology progress note:     Patient is a 66y old  Male who presents with a chief complaint of Dark stool (23 Dec 2021 12:54)       Admitted on: 12-21-21    We are following the patient for esophagitis     Interval History:  only tolerating liquids   c/o pain while drinking  passing gas       PAST MEDICAL & SURGICAL HISTORY:  Kidney transplanted    Hypertension    DMII (diabetes mellitus, type 2)    No significant past surgical history        MEDICATIONS  (STANDING):  dextrose 50% Injectable 25 Gram(s) IV Push once  furosemide    Tablet 20 milliGRAM(s) Oral daily  heparin   Injectable 5000 Unit(s) SubCutaneous every 8 hours  influenza  Vaccine (HIGH DOSE) 0.7 milliLiter(s) IntraMuscular once  insulin glargine Injectable (LANTUS) 12 Unit(s) SubCutaneous at bedtime  insulin lispro (ADMELOG) corrective regimen sliding scale   SubCutaneous three times a day before meals  insulin lispro (ADMELOG) corrective regimen sliding scale   SubCutaneous at bedtime  insulin lispro Injectable (ADMELOG) 4 Unit(s) SubCutaneous three times a day before meals  lactated ringers. 1000 milliLiter(s) (75 mL/Hr) IV Continuous <Continuous>  melatonin 5 milliGRAM(s) Oral at bedtime  methocarbamol 500 milliGRAM(s) Oral every 8 hours  metoprolol tartrate 25 milliGRAM(s) Oral three times a day  mycophenolate mofetil 500 milliGRAM(s) Oral two times a day  NIFEdipine XL 90 milliGRAM(s) Oral daily  pantoprazole  Injectable 40 milliGRAM(s) IV Push two times a day  sucralfate suspension 1 Gram(s) Oral four times a day  tacrolimus 5 milliGRAM(s) Oral two times a day    MEDICATIONS  (PRN):      Allergies  No Known Allergies      Review of Systems:   Cardiovascular:  No Chest Pain, No Palpitations  Respiratory:  No Cough, No Dyspnea  Gastrointestinal:  As described in HPI    Physical Examination:  T(C): 36.1 (12-24-21 @ 07:00), Max: 36.6 (12-23-21 @ 22:02)  HR: 58 (12-24-21 @ 07:00) (58 - 74)  BP: 128/62 (12-24-21 @ 07:00) (124/65 - 134/61)  RR: 18 (12-24-21 @ 07:00) (18 - 19)  SpO2: --      12-23-21 @ 07:01  -  12-24-21 @ 07:00  --------------------------------------------------------  IN: 1125 mL / OUT: 1710 mL / NET: -585 mL    12-24-21 @ 07:01  -  12-24-21 @ 11:31  --------------------------------------------------------  IN: 100 mL / OUT: 610 mL / NET: -510 mL      Constitutional: No acute distress.  Respiratory:  No signs of respiratory distress. Lung sounds are clear bilaterally.  Cardiovascular:  S1 S2, Regular rate and rhythm.  Abdominal: Abdomen is soft, symmetric, and non-tender without distention. There are no visible lesions or scars. Bowel sounds are present and normoactive in all four quadrants. No masses, hepatomegaly, or splenomegaly are noted.   Skin: No rashes, No Jaundice.        Data:                        14.6   7.94  )-----------( 147      ( 24 Dec 2021 04:30 )             43.2     Hgb trend:  14.6  12-24-21 @ 04:30  13.8  12-23-21 @ 11:00  14.3  12-23-21 @ 04:30  13.3  12-22-21 @ 06:30  12.7  12-21-21 @ 20:00  14.2  12-21-21 @ 14:44        12-24    141  |  99  |  12  ----------------------------<  90  3.4<L>   |  25  |  0.9    Ca    8.8      24 Dec 2021 04:30  Mg     1.8     12-24    TPro  6.1  /  Alb  3.6  /  TBili  0.8  /  DBili  x   /  AST  14  /  ALT  7   /  AlkPhos  83  12-24    Liver panel trend:  TBili 0.8   /   AST 14   /   ALT 7   /   AlkP 83   /   Tptn 6.1   /   Alb 3.6    /   DBili --      12-24  TBili 0.7   /   AST 16   /   ALT 8   /   AlkP 90   /   Tptn 6.3   /   Alb 3.8    /   DBili --      12-23  TBili 0.6   /   AST 11   /   ALT 6   /   AlkP 88   /   Tptn 6.0   /   Alb 3.9    /   DBili --      12-22  TBili 0.7   /   AST 17   /   ALT 9   /   AlkP 95   /   Tptn 7.3   /   Alb 4.7    /   DBili --      12-21             Radiology:

## 2021-12-24 NOTE — PROGRESS NOTE ADULT - SUBJECTIVE AND OBJECTIVE BOX
FELICIANO ABAD    Patient is a 66y old  Male who presents with a chief complaint of Dark stool (24 Dec 2021 11:30)    HPI:  Mr. Abad is a 66 year old (active smoker) [place] Speaking male patient known to have: Baseline: AOx3; lives with wife at home; ambulates independently unassisted; has 14 steps at home, Hypertension. Home meds Lopressor 25mg TID, Nifedipine 90mg QD, Lasix 20mg QD, DM II. No hba1c. Home med Repgaglinide 1mg QD, History of kidney transplant in [place] in 2015. Patient unsure why. He follows with Dr Moreno located at [address] [place] [address]. Home meds Tacrolimus 5mg BID and Mycophenolate Mofetil 500mg BID and Chronic Back Pain. Presented to the ED for two episodes of dark stools yesterday, he also reports having 3 episodes of dark vomitus (large amount). Never had a prior episodes, denies Denies fever, chills, cp, sob, abd pain, hematuria, he is not on any AC, denies NSAID use, EtOH use. On admission: T(F): 97.6, HR: 85, BP: 142/69, RR: 20, SpO2: 96%. BUN 39 (baseline 14), Cr 1.6 ( Baseline 1.0),    (21 Dec 2021 17:30)    INTERVAL HPI/OVERNIGHT EVENTS: no overnight events, pt still cannot tolerate solid food due to pain, tolerates liquid diet only. No BM today    ROS: All ROS negative except as documented above.    PHYSICAL EXAM:  T(C): 36.1, Max: 36.6 (12-23-21 @ 22:02)  HR: 72 (58 - 74)  BP: 148/68 (124/65 - 148/68)  RR: 18 (18 - 19)  SpO2: --    GENERAL: NAD  PULMONARY/CHEST: No rales, rhonchi, wheezing  CARDIOVASC: Regular rate and rhythm; No murmurs  GI/ABDOMEN: Soft, Nontender, Nondistended; Bowel sounds present  EXTREMITIES:  No clubbing, cyanosis, or edema, no deformity. No calf tenderness b/l.  NERVOUS SYSTEM:  Alert & Oriented X3, no gross neurological  deficit     Consultant(s) Notes Reviewed by me.     LABS:                        14.6   7.94  )-----------( 147      ( 24 Dec 2021 04:30 )             43.2     12-24    141  |  99  |  12  ----------------------------<  90  3.4<L>   |  25  |  0.9    Ca    8.8      24 Dec 2021 04:30  Mg     1.8     12-24    TPro  6.1  /  Alb  3.6  /  TBili  0.8  /  DBili  x   /  AST  14  /  ALT  7   /  AlkPhos  83  12-24      CAPILLARY BLOOD GLUCOSE  POCT Blood Glucose.: 177 mg/dL (24 Dec 2021 11:38)  POCT Blood Glucose.: 108 mg/dL (24 Dec 2021 07:32)  POCT Blood Glucose.: 170 mg/dL (23 Dec 2021 21:09)  POCT Blood Glucose.: 165 mg/dL (23 Dec 2021 16:21)    RADIOLOGY & ADDITIONAL TESTS:  no new tests    MEDICATIONS  (STANDING):  dextrose 50% Injectable 25 Gram(s) IV Push once  furosemide    Tablet 20 milliGRAM(s) Oral daily  heparin   Injectable 5000 Unit(s) SubCutaneous every 8 hours  influenza  Vaccine (HIGH DOSE) 0.7 milliLiter(s) IntraMuscular once  insulin glargine Injectable (LANTUS) 12 Unit(s) SubCutaneous at bedtime  insulin lispro (ADMELOG) corrective regimen sliding scale   SubCutaneous three times a day before meals  insulin lispro (ADMELOG) corrective regimen sliding scale   SubCutaneous at bedtime  insulin lispro Injectable (ADMELOG) 4 Unit(s) SubCutaneous three times a day before meals  melatonin 5 milliGRAM(s) Oral at bedtime  methocarbamol 500 milliGRAM(s) Oral every 8 hours  metoprolol tartrate 25 milliGRAM(s) Oral three times a day  mycophenolate mofetil 500 milliGRAM(s) Oral two times a day  NIFEdipine XL 90 milliGRAM(s) Oral daily  pantoprazole Infusion 8 mG/Hr (10 mL/Hr) IV Continuous <Continuous>  sucralfate suspension 1 Gram(s) Oral four times a day  tacrolimus 5 milliGRAM(s) Oral two times a day    MEDICATIONS  (PRN):

## 2021-12-24 NOTE — PHYSICAL THERAPY INITIAL EVALUATION ADULT - SPECIFY REASON(S)
200 pm Attempted to see pt for PT initial evaluation however, when approached at bedside , pt c/o lack of energy, and apparently pt has not been eating, mostly on IV fluids. PT plan of care discussed,

## 2021-12-24 NOTE — PROGRESS NOTE ADULT - ASSESSMENT
67 y/o M pmhx of renal transplant (On Tacrolimus & Mycophenolate), HTN, DM presents to ed for two episodes of dark stools w/ Nausea/Vomiting.     #)Melena/Coffee ground emesis Upper GI Bleed likely sec to severe esophagitis likely secondary to tacrolimus and mycophenolate mofetil  s/p EGD 12/22 LA grade D esophagitis, erosive gastritis  Hemodynamically stable   Hb stable  No BM overnight, passing gas  No vomiting only tolerating liquids    Rec:  clear liquid diet  IV Pantoprazole drip   carafate 1 gm 4 times/day   Await pathology results  Avoid NSAID, smoking   Need Repeat endoscopy in 2 months to document healing of esophagitis   Esophagitis ?from tacrolimus >10% ulcerative esophagitis, Mycophenolate mofetil 3 to 20% of esophagitis  Please touch base with nephrology about possible side effects of medications either change in dosage or alternatives  continue with clear liquid diet and advance slowly if tolerated ; if tolerating diet can be discharged to home    spoke to the family about the plan

## 2021-12-25 LAB
GLUCOSE BLDC GLUCOMTR-MCNC: 131 MG/DL — HIGH (ref 70–99)
GLUCOSE BLDC GLUCOMTR-MCNC: 153 MG/DL — HIGH (ref 70–99)
GLUCOSE BLDC GLUCOMTR-MCNC: 160 MG/DL — HIGH (ref 70–99)
GLUCOSE BLDC GLUCOMTR-MCNC: 61 MG/DL — LOW (ref 70–99)

## 2021-12-25 PROCEDURE — 99232 SBSQ HOSP IP/OBS MODERATE 35: CPT

## 2021-12-25 RX ORDER — PANTOPRAZOLE SODIUM 20 MG/1
40 TABLET, DELAYED RELEASE ORAL
Refills: 0 | Status: DISCONTINUED | OUTPATIENT
Start: 2021-12-25 | End: 2021-12-27

## 2021-12-25 RX ADMIN — Medication 1 GRAM(S): at 18:09

## 2021-12-25 RX ADMIN — Medication 25 MILLIGRAM(S): at 05:58

## 2021-12-25 RX ADMIN — Medication 1: at 10:43

## 2021-12-25 RX ADMIN — HEPARIN SODIUM 5000 UNIT(S): 5000 INJECTION INTRAVENOUS; SUBCUTANEOUS at 21:17

## 2021-12-25 RX ADMIN — METHOCARBAMOL 500 MILLIGRAM(S): 500 TABLET, FILM COATED ORAL at 05:58

## 2021-12-25 RX ADMIN — TACROLIMUS 5 MILLIGRAM(S): 5 CAPSULE ORAL at 18:51

## 2021-12-25 RX ADMIN — HEPARIN SODIUM 5000 UNIT(S): 5000 INJECTION INTRAVENOUS; SUBCUTANEOUS at 14:07

## 2021-12-25 RX ADMIN — MYCOPHENOLATE MOFETIL 500 MILLIGRAM(S): 250 CAPSULE ORAL at 05:59

## 2021-12-25 RX ADMIN — Medication 90 MILLIGRAM(S): at 05:59

## 2021-12-25 RX ADMIN — HEPARIN SODIUM 5000 UNIT(S): 5000 INJECTION INTRAVENOUS; SUBCUTANEOUS at 05:57

## 2021-12-25 RX ADMIN — Medication 25 MILLIGRAM(S): at 14:07

## 2021-12-25 RX ADMIN — METHOCARBAMOL 500 MILLIGRAM(S): 500 TABLET, FILM COATED ORAL at 14:07

## 2021-12-25 RX ADMIN — Medication 20 MILLIGRAM(S): at 05:58

## 2021-12-25 RX ADMIN — TACROLIMUS 5 MILLIGRAM(S): 5 CAPSULE ORAL at 05:57

## 2021-12-25 RX ADMIN — MYCOPHENOLATE MOFETIL 500 MILLIGRAM(S): 250 CAPSULE ORAL at 18:09

## 2021-12-25 RX ADMIN — Medication 25 MILLIGRAM(S): at 21:16

## 2021-12-25 RX ADMIN — Medication 1 GRAM(S): at 14:06

## 2021-12-25 RX ADMIN — Medication 1 GRAM(S): at 05:58

## 2021-12-25 RX ADMIN — METHOCARBAMOL 500 MILLIGRAM(S): 500 TABLET, FILM COATED ORAL at 21:16

## 2021-12-25 RX ADMIN — INSULIN GLARGINE 12 UNIT(S): 100 INJECTION, SOLUTION SUBCUTANEOUS at 21:16

## 2021-12-25 RX ADMIN — PANTOPRAZOLE SODIUM 40 MILLIGRAM(S): 20 TABLET, DELAYED RELEASE ORAL at 18:13

## 2021-12-25 RX ADMIN — Medication 5 MILLIGRAM(S): at 21:16

## 2021-12-25 NOTE — PROGRESS NOTE ADULT - SUBJECTIVE AND OBJECTIVE BOX
FELICIANO ABAD    Patient is a 66y old  Male who presents with a chief complaint of Dark stool (24 Dec 2021 14:02)    HPI:  Mr. Abad is a 66 year old (active smoker) [place] Speaking male patient known to have: Baseline: AOx3; lives with wife at home; ambulates independently unassisted; has 14 steps at home, Hypertension. Home meds Lopressor 25mg TID, Nifedipine 90mg QD, Lasix 20mg QD, DM II. No hba1c. Home med Repgaglinide 1mg QD, History of kidney transplant in [place] in 2015. Patient unsure why. He follows with Dr Moreno located at [address] [place] [address]. Home meds Tacrolimus 5mg BID and Mycophenolate Mofetil 500mg BID and Chronic Back Pain. Presented to the ED for two episodes of dark stools yesterday, he also reports having 3 episodes of dark vomitus (large amount). Never had a prior episodes, denies Denies fever, chills, cp, sob, abd pain, hematuria, he is not on any AC, denies NSAID use, EtOH use. On admission: T(F): 97.6, HR: 85, BP: 142/69, RR: 20, SpO2: 96%. BUN 39 (baseline 14), Cr 1.6 ( Baseline 1.0),    (21 Dec 2021 17:30)    INTERVAL HPI/OVERNIGHT EVENTS: pt still complaints of esophageal burning pain and decreased PO intake.    ROS: All ROS negative except as documented above     PHYSICAL EXAM:  T(C): 36.8, Max: 37 (12-24-21 @ 21:31)  HR: 58 (58 - 65)  BP: 127/62 (127/62 - 148/65)  RR: 18 (18 - 18)  SpO2: 96% (96% - 96%)    GENERAL: NAD  PULMONARY/CHEST: No rales, rhonchi, wheezing  CARDIOVASC: Regular rate and rhythm; No murmurs  GI/ABDOMEN: Soft, Nontender, Nondistended; Bowel sounds present  EXTREMITIES:  No clubbing, cyanosis, or edema, no deformity. No calf tenderness b/l.  NERVOUS SYSTEM:  Alert & Oriented X3, no gross neurological  deficit     LABS:                        14.6   7.94  )-----------( 147      ( 24 Dec 2021 04:30 )             43.2     12-24    141  |  99  |  12  ----------------------------<  90  3.4<L>   |  25  |  0.9    Ca    8.8      24 Dec 2021 04:30  Mg     1.8     12-24    TPro  6.1  /  Alb  3.6  /  TBili  0.8  /  DBili  x   /  AST  14  /  ALT  7   /  AlkPhos  83  12-24        CAPILLARY BLOOD GLUCOSE  POCT Blood Glucose.: 153 mg/dL (25 Dec 2021 10:40)  POCT Blood Glucose.: 61 mg/dL (25 Dec 2021 08:04)  POCT Blood Glucose.: 146 mg/dL (24 Dec 2021 21:24)  POCT Blood Glucose.: 196 mg/dL (24 Dec 2021 16:57)    MEDICATIONS  (STANDING):  dextrose 50% Injectable 25 Gram(s) IV Push once  furosemide    Tablet 20 milliGRAM(s) Oral daily  heparin   Injectable 5000 Unit(s) SubCutaneous every 8 hours  influenza  Vaccine (HIGH DOSE) 0.7 milliLiter(s) IntraMuscular once  insulin glargine Injectable (LANTUS) 12 Unit(s) SubCutaneous at bedtime  insulin lispro (ADMELOG) corrective regimen sliding scale   SubCutaneous three times a day before meals  insulin lispro (ADMELOG) corrective regimen sliding scale   SubCutaneous at bedtime  insulin lispro Injectable (ADMELOG) 4 Unit(s) SubCutaneous three times a day before meals  melatonin 5 milliGRAM(s) Oral at bedtime  methocarbamol 500 milliGRAM(s) Oral every 8 hours  metoprolol tartrate 25 milliGRAM(s) Oral three times a day  mycophenolate mofetil 500 milliGRAM(s) Oral two times a day  NIFEdipine XL 90 milliGRAM(s) Oral daily  pantoprazole Infusion 8 mG/Hr (10 mL/Hr) IV Continuous <Continuous>  sucralfate suspension 1 Gram(s) Oral four times a day  tacrolimus 5 milliGRAM(s) Oral two times a day    MEDICATIONS  (PRN):

## 2021-12-25 NOTE — PROGRESS NOTE ADULT - ASSESSMENT
65 y/o M pmhx of renal transplant (On Tacrolimus & Mycophenolate), HTN, DM presents to ed for two episodes of dark stools w/ Nausea/Vomiting.     # Dark stools with nausea and vomiting   - ELEUTERIO pos for Melena in ED   - Hemodynamically stable; No signs of active GI bleed;   - Hb 13 (baseline 14-15); BUN 39 (baseline 14), Keep active T&S; Monitor H&H; Transfuse>7;   - EGD 12/22 Grade D esophagitis compatible with nonspecific erosive esophagitis. Erosions in the stomach compatible with erosive gastritis. (Biopsy). Prominent mucosal fold was noted in the duodenum cold forceps biopsy was performed for histology  - s/p protonix gtt -> start PPI IV bid today, cont carafate 1gm qid  - clear liquid diet   - F/u GI for repeat endoscopy in 2 months     # KAREEM - Most likely pre renal due to poor PO intake - resolved   - improved with IVF  - Admission Cr. 1.6>> 1.1 (baseline 1)    # Hypokalemia  -Repleted; Monitor BMP;     # hx of Renal Transplant - Patient unsure why  - cont home meds  Tacrolimus 5mg BID and Mycophenolate Mofetil 500mg BID. Not on prednisone  - tacrolimus trough and Mycophenolate levels WNL   - fu Nephrology    # HTN  -c/w Home Lopressor 25mg TID, Nifedipine 90mg Daily & Lasix 20mg Daily    # T2DM  -Home med Repgaglinide 1mg QD  -Poorly controlled;- a1c 7.8(12/22); lantus 12, lispro 4, ISS     Diet: clears   Activity: Ambulate as Tolerated  DVT ppx: Heparin SQ  GI ppx: Protonix IV bid   FULL CODE

## 2021-12-25 NOTE — PROGRESS NOTE ADULT - ASSESSMENT
65 yo male  pmh of renal transplant, HTN and DM presents to ed for two episodes of dark stools a/w nausea.    # Nausea and vomiting with decrease PO intake, melena. Esophagitis, most likely induced by medications.  - EGD done 12/22: Grade D esophagitis compatible with nonspecific erosive esophagitis.      Erosions in the stomach compatible with erosive gastritis. (Biopsy).      -Prominent mucosal fold was noted in the duodenum cold forceps biopsy was performed for histology. .    - change Pantoprazole to IV BID  - cont Carafate  - advance diet to soft  - avoid NSAIDs, smoking cessation    - f/u with GI biopsy results as OP  - Hb stable  - no transfusion on this admission     # H/o Renal Transplant  - Home meds Tacrolimus 5mg BID and Mycophenolate Mofetil 500mg BID. Not on prednisone  - f/u nephrology   - tacrolimus trough and Mycophenolate levels WNL   - Monitor BUN and Cr, Avoid nephrotoxic agents and contrast exposure if possible    #DM II  - A1c 11/2/21 6.9  - Home med Repgaglinide 1mg QD  - monitor FS and start insulin if FS>180     # Hypertension -c/w  home meds Lopressor 25mg TID, Nifedipine 90mg QD, Lasix 20mg QD    DVT ppx Heparin SQ

## 2021-12-25 NOTE — PROGRESS NOTE ADULT - SUBJECTIVE AND OBJECTIVE BOX
CHIEF COMPLAINT:  Patient is a 66y old  Male who presents with a chief complaint of Dark stool (25 Dec 2021 16:44)      INTERVAL HISTORY/OVERNIGHT EVENTS:  12/25  -no events overnight  -c/o dec po intake    ======================  MEDICATIONS:  dextrose 50% Injectable 25 Gram(s) IV Push once  furosemide    Tablet 20 milliGRAM(s) Oral daily  heparin   Injectable 5000 Unit(s) SubCutaneous every 8 hours  influenza  Vaccine (HIGH DOSE) 0.7 milliLiter(s) IntraMuscular once  insulin glargine Injectable (LANTUS) 12 Unit(s) SubCutaneous at bedtime  insulin lispro (ADMELOG) corrective regimen sliding scale   SubCutaneous three times a day before meals  insulin lispro (ADMELOG) corrective regimen sliding scale   SubCutaneous at bedtime  insulin lispro Injectable (ADMELOG) 4 Unit(s) SubCutaneous three times a day before meals  melatonin 5 milliGRAM(s) Oral at bedtime  methocarbamol 500 milliGRAM(s) Oral every 8 hours  metoprolol tartrate 25 milliGRAM(s) Oral three times a day  mycophenolate mofetil 500 milliGRAM(s) Oral two times a day  NIFEdipine XL 90 milliGRAM(s) Oral daily  pantoprazole  Injectable 40 milliGRAM(s) IV Push two times a day  sucralfate suspension 1 Gram(s) Oral four times a day  tacrolimus 5 milliGRAM(s) Oral two times a day    DRIPS:    PRN:       ======================  PHYSICAL EXAMINATION:  GEN:  nad.   HEENT:  eomi. ncat  PULM:  b/l bs.  clear.  no wheezing. no crackles or rales.   CARD: regular. s1, s2.  no murmurs.   ABD: +bs. ntnd  EXT:  no new rashes.  no pitting edema b/l .   NEURO:  no new focal deficits.   ======================  OBJECTIVE:        VS:  T(F): 98.7 (12-25 @ 16:00), Max: 98.7 (12-25 @ 16:00)  HR: 61 (12-25 @ 16:00) (58 - 65)  BP: 138/63 (12-25 @ 16:00) (127/62 - 138/63)  RR: 18 (12-25 @ 16:00) (18 - 18)  SpO2: 96% (12-25 @ 07:40) (96% - 96%)  CVP(mm Hg): --  CO: --  CI: --  PA: --  PCWP: --    I/O:      12-22 @ 07:01  -  12-23 @ 07:00  --------------------------------------------------------  IN: 0 mL / OUT: 600 mL / NET: -600 mL    12-23 @ 07:01  -  12-24 @ 07:00  --------------------------------------------------------  IN: 1125 mL / OUT: 1710 mL / NET: -585 mL    12-24 @ 07:01  -  12-25 @ 07:00  --------------------------------------------------------  IN: 1575 mL / OUT: 2160 mL / NET: -585 mL        Weight trend:  Weight (kg): 62.1 (12-22)    ======================    LABS:                          14.6   7.94  )-----------( 147      ( 24 Dec 2021 04:30 )             43.2     12-24    141  |  99  |  12  ----------------------------<  90  3.4<L>   |  25  |  0.9    Ca    8.8      24 Dec 2021 04:30  Mg     1.8     12-24    TPro  6.1  /  Alb  3.6  /  TBili  0.8  /  DBili  x   /  AST  14  /  ALT  7   /  AlkPhos  83  12-24    LIVER FUNCTIONS - ( 24 Dec 2021 04:30 )  Alb: 3.6 g/dL / Pro: 6.1 g/dL / ALK PHOS: 83 U/L / ALT: 7 U/L / AST: 14 U/L / GGT: x                         Cultures:

## 2021-12-26 LAB
GLUCOSE BLDC GLUCOMTR-MCNC: 161 MG/DL — HIGH (ref 70–99)
GLUCOSE BLDC GLUCOMTR-MCNC: 213 MG/DL — HIGH (ref 70–99)
GLUCOSE BLDC GLUCOMTR-MCNC: 262 MG/DL — HIGH (ref 70–99)
GLUCOSE BLDC GLUCOMTR-MCNC: 58 MG/DL — LOW (ref 70–99)

## 2021-12-26 PROCEDURE — 99232 SBSQ HOSP IP/OBS MODERATE 35: CPT

## 2021-12-26 RX ORDER — INSULIN GLARGINE 100 [IU]/ML
9 INJECTION, SOLUTION SUBCUTANEOUS AT BEDTIME
Refills: 0 | Status: DISCONTINUED | OUTPATIENT
Start: 2021-12-26 | End: 2021-12-30

## 2021-12-26 RX ORDER — ACETAMINOPHEN 500 MG
650 TABLET ORAL EVERY 6 HOURS
Refills: 0 | Status: DISCONTINUED | OUTPATIENT
Start: 2021-12-26 | End: 2021-12-30

## 2021-12-26 RX ADMIN — Medication 5 MILLIGRAM(S): at 21:35

## 2021-12-26 RX ADMIN — Medication 2: at 12:52

## 2021-12-26 RX ADMIN — MYCOPHENOLATE MOFETIL 500 MILLIGRAM(S): 250 CAPSULE ORAL at 18:34

## 2021-12-26 RX ADMIN — Medication 1 GRAM(S): at 06:01

## 2021-12-26 RX ADMIN — Medication 1 GRAM(S): at 13:56

## 2021-12-26 RX ADMIN — METHOCARBAMOL 500 MILLIGRAM(S): 500 TABLET, FILM COATED ORAL at 13:55

## 2021-12-26 RX ADMIN — Medication 25 MILLIGRAM(S): at 21:35

## 2021-12-26 RX ADMIN — INSULIN GLARGINE 9 UNIT(S): 100 INJECTION, SOLUTION SUBCUTANEOUS at 21:35

## 2021-12-26 RX ADMIN — PANTOPRAZOLE SODIUM 40 MILLIGRAM(S): 20 TABLET, DELAYED RELEASE ORAL at 06:01

## 2021-12-26 RX ADMIN — METHOCARBAMOL 500 MILLIGRAM(S): 500 TABLET, FILM COATED ORAL at 06:02

## 2021-12-26 RX ADMIN — METHOCARBAMOL 500 MILLIGRAM(S): 500 TABLET, FILM COATED ORAL at 21:35

## 2021-12-26 RX ADMIN — Medication 20 MILLIGRAM(S): at 06:02

## 2021-12-26 RX ADMIN — TACROLIMUS 5 MILLIGRAM(S): 5 CAPSULE ORAL at 18:28

## 2021-12-26 RX ADMIN — Medication 4 UNIT(S): at 12:52

## 2021-12-26 RX ADMIN — Medication 25 MILLIGRAM(S): at 13:55

## 2021-12-26 RX ADMIN — Medication 25 MILLIGRAM(S): at 06:02

## 2021-12-26 RX ADMIN — PANTOPRAZOLE SODIUM 40 MILLIGRAM(S): 20 TABLET, DELAYED RELEASE ORAL at 18:28

## 2021-12-26 RX ADMIN — HEPARIN SODIUM 5000 UNIT(S): 5000 INJECTION INTRAVENOUS; SUBCUTANEOUS at 06:01

## 2021-12-26 RX ADMIN — Medication 650 MILLIGRAM(S): at 10:24

## 2021-12-26 RX ADMIN — Medication 1 GRAM(S): at 18:34

## 2021-12-26 RX ADMIN — Medication 90 MILLIGRAM(S): at 06:02

## 2021-12-26 RX ADMIN — Medication 1: at 21:35

## 2021-12-26 RX ADMIN — HEPARIN SODIUM 5000 UNIT(S): 5000 INJECTION INTRAVENOUS; SUBCUTANEOUS at 21:35

## 2021-12-26 RX ADMIN — MYCOPHENOLATE MOFETIL 500 MILLIGRAM(S): 250 CAPSULE ORAL at 06:02

## 2021-12-26 RX ADMIN — TACROLIMUS 5 MILLIGRAM(S): 5 CAPSULE ORAL at 06:02

## 2021-12-26 RX ADMIN — HEPARIN SODIUM 5000 UNIT(S): 5000 INJECTION INTRAVENOUS; SUBCUTANEOUS at 13:55

## 2021-12-26 NOTE — PROGRESS NOTE ADULT - SUBJECTIVE AND OBJECTIVE BOX
FELICIANO ABDA    Patient is a 66y old  Male who presents with a chief complaint of Dark stool (25 Dec 2021 22:45)    HPI:  Mr. Abad is a 66 year old (active smoker) Telugu Speaking male patient known to have: Baseline: AOx3; lives with wife at home; ambulates independently unassisted; has 14 steps at home, Hypertension. Home meds Lopressor 25mg TID, Nifedipine 90mg QD, Lasix 20mg QD, DM II. No hba1c. Home med Repgaglinide 1mg QD, History of kidney transplant in [place] in 2015. Patient unsure why. He follows with Dr Moreno located at [address] [place] [address]. Home meds Tacrolimus 5mg BID and Mycophenolate Mofetil 500mg BID and Chronic Back Pain. Presented to the ED for two episodes of dark stools yesterday, he also reports having 3 episodes of dark vomitus (large amount). Never had a prior episodes, denies Denies fever, chills, cp, sob, abd pain, hematuria, he is not on any AC, denies NSAID use, EtOH use. On admission: T(F): 97.6, HR: 85, BP: 142/69, RR: 20, SpO2: 96%. BUN 39 (baseline 14), Cr 1.6 ( Baseline 1.0),    (21 Dec 2021 17:30)    INTERVAL HPI/OVERNIGHT EVENTS: no events overnight. Pt states he still has trouble to swallow even liquid diet, but states that Insure was going down fine.      ROS: All ROS negative except epigastric burning pain.    PHYSICAL EXAM:  T(C): 36.2, Max: 37.1 (12-25-21 @ 16:00)  HR: 65 (57 - 65)  BP: 122/68 (115/56 - 138/63)  RR: 18 (18 - 18)  SpO2: 96% (96% - 96%)    GENERAL: NAD  PULMONARY/CHEST: No rales, rhonchi, wheezing  CARDIOVASC: Regular rate and rhythm; No murmurs  GI/ABDOMEN: Soft, Nontender, Nondistended; Bowel sounds present  EXTREMITIES:  No clubbing, cyanosis, or edema, no deformity. No calf tenderness b/l.  NERVOUS SYSTEM:  Alert & Oriented X3, no gross neurological  deficit     Consultant(s) Notes Reviewed by me.     CAPILLARY BLOOD GLUCOSE  POCT Blood Glucose.: 213 mg/dL (26 Dec 2021 12:01)  POCT Blood Glucose.: 58 mg/dL (26 Dec 2021 07:55)  POCT Blood Glucose.: 160 mg/dL (25 Dec 2021 21:06)  POCT Blood Glucose.: 131 mg/dL (25 Dec 2021 17:42)      MEDICATIONS  (STANDING):  dextrose 50% Injectable 25 Gram(s) IV Push once  furosemide    Tablet 20 milliGRAM(s) Oral daily  heparin   Injectable 5000 Unit(s) SubCutaneous every 8 hours  influenza  Vaccine (HIGH DOSE) 0.7 milliLiter(s) IntraMuscular once  insulin glargine Injectable (LANTUS) 12 Unit(s) SubCutaneous at bedtime  insulin lispro (ADMELOG) corrective regimen sliding scale   SubCutaneous three times a day before meals  insulin lispro (ADMELOG) corrective regimen sliding scale   SubCutaneous at bedtime  insulin lispro Injectable (ADMELOG) 4 Unit(s) SubCutaneous three times a day before meals  melatonin 5 milliGRAM(s) Oral at bedtime  methocarbamol 500 milliGRAM(s) Oral every 8 hours  metoprolol tartrate 25 milliGRAM(s) Oral three times a day  mycophenolate mofetil 500 milliGRAM(s) Oral two times a day  NIFEdipine XL 90 milliGRAM(s) Oral daily  pantoprazole  Injectable 40 milliGRAM(s) IV Push two times a day  sucralfate suspension 1 Gram(s) Oral four times a day  tacrolimus 5 milliGRAM(s) Oral two times a day    MEDICATIONS  (PRN):  acetaminophen     Tablet .. 650 milliGRAM(s) Oral every 6 hours PRN Temp greater or equal to 38C (100.4F), Mild Pain (1 - 3)

## 2021-12-26 NOTE — PHYSICAL THERAPY INITIAL EVALUATION ADULT - PERTINENT HX OF CURRENT PROBLEM, REHAB EVAL
65 yo male, pmh of renal transplant- follows at [facility], htn, dm, presents to ed for two episodes of dark stools a/w nausea. Denies fever, chills, cp, sob, abd pain, hematuria.

## 2021-12-26 NOTE — CHART NOTE - NSCHARTNOTEFT_GEN_A_CORE
We are following the pt for severe esophagitis  Tolerating clear liquids  c/o pain while having solid food  c/w PPI drip  C/w carafate  Soft diet

## 2021-12-26 NOTE — PHYSICAL THERAPY INITIAL EVALUATION ADULT - GENERAL OBSERVATIONS, REHAB EVAL
Pt encountered in semi-barcenas position in bed in NAD, no c/o pain and agreeable with PT. Pt performed bed mobility supine/sit to EOB with close supervision, transfer mobility CGA, ambulated 75 ft CGA using RW and negotiated 9 steps Min A using 1HR. Pt tolerated therapy session and left seated in chair in NAD.

## 2021-12-26 NOTE — PHYSICAL THERAPY INITIAL EVALUATION ADULT - GAIT TRAINING, PT EVAL
Supervision in [facility] 150 ft using RW and CS in [facility] mgt 2FOS using 1HR by DC

## 2021-12-27 LAB
ALBUMIN SERPL ELPH-MCNC: 3.5 G/DL — SIGNIFICANT CHANGE UP (ref 3.5–5.2)
ALP SERPL-CCNC: 75 U/L — SIGNIFICANT CHANGE UP (ref 30–115)
ALT FLD-CCNC: 11 U/L — SIGNIFICANT CHANGE UP (ref 0–41)
ANION GAP SERPL CALC-SCNC: 16 MMOL/L — HIGH (ref 7–14)
AST SERPL-CCNC: 15 U/L — SIGNIFICANT CHANGE UP (ref 0–41)
BASOPHILS # BLD AUTO: 0.02 K/UL — SIGNIFICANT CHANGE UP (ref 0–0.2)
BASOPHILS NFR BLD AUTO: 0.3 % — SIGNIFICANT CHANGE UP (ref 0–1)
BILIRUB SERPL-MCNC: 0.6 MG/DL — SIGNIFICANT CHANGE UP (ref 0.2–1.2)
BUN SERPL-MCNC: 12 MG/DL — SIGNIFICANT CHANGE UP (ref 10–20)
CALCIUM SERPL-MCNC: 8.8 MG/DL — SIGNIFICANT CHANGE UP (ref 8.5–10.1)
CHLORIDE SERPL-SCNC: 98 MMOL/L — SIGNIFICANT CHANGE UP (ref 98–110)
CO2 SERPL-SCNC: 26 MMOL/L — SIGNIFICANT CHANGE UP (ref 17–32)
CREAT SERPL-MCNC: 1 MG/DL — SIGNIFICANT CHANGE UP (ref 0.7–1.5)
EOSINOPHIL # BLD AUTO: 0.21 K/UL — SIGNIFICANT CHANGE UP (ref 0–0.7)
EOSINOPHIL NFR BLD AUTO: 3.4 % — SIGNIFICANT CHANGE UP (ref 0–8)
GLUCOSE BLDC GLUCOMTR-MCNC: 214 MG/DL — HIGH (ref 70–99)
GLUCOSE BLDC GLUCOMTR-MCNC: 234 MG/DL — HIGH (ref 70–99)
GLUCOSE BLDC GLUCOMTR-MCNC: 272 MG/DL — HIGH (ref 70–99)
GLUCOSE BLDC GLUCOMTR-MCNC: 312 MG/DL — HIGH (ref 70–99)
GLUCOSE SERPL-MCNC: 143 MG/DL — HIGH (ref 70–99)
HCT VFR BLD CALC: 39 % — LOW (ref 42–52)
HGB BLD-MCNC: 13.5 G/DL — LOW (ref 14–18)
IMM GRANULOCYTES NFR BLD AUTO: 0.2 % — SIGNIFICANT CHANGE UP (ref 0.1–0.3)
LYMPHOCYTES # BLD AUTO: 1.76 K/UL — SIGNIFICANT CHANGE UP (ref 1.2–3.4)
LYMPHOCYTES # BLD AUTO: 28.3 % — SIGNIFICANT CHANGE UP (ref 20.5–51.1)
MAGNESIUM SERPL-MCNC: 1.4 MG/DL — LOW (ref 1.8–2.4)
MCHC RBC-ENTMCNC: 31.1 PG — HIGH (ref 27–31)
MCHC RBC-ENTMCNC: 34.6 G/DL — SIGNIFICANT CHANGE UP (ref 32–37)
MCV RBC AUTO: 89.9 FL — SIGNIFICANT CHANGE UP (ref 80–94)
MONOCYTES # BLD AUTO: 0.68 K/UL — HIGH (ref 0.1–0.6)
MONOCYTES NFR BLD AUTO: 11 % — HIGH (ref 1.7–9.3)
NEUTROPHILS # BLD AUTO: 3.53 K/UL — SIGNIFICANT CHANGE UP (ref 1.4–6.5)
NEUTROPHILS NFR BLD AUTO: 56.8 % — SIGNIFICANT CHANGE UP (ref 42.2–75.2)
NRBC # BLD: 0 /100 WBCS — SIGNIFICANT CHANGE UP (ref 0–0)
PLATELET # BLD AUTO: 166 K/UL — SIGNIFICANT CHANGE UP (ref 130–400)
POTASSIUM SERPL-MCNC: 3.1 MMOL/L — LOW (ref 3.5–5)
POTASSIUM SERPL-SCNC: 3.1 MMOL/L — LOW (ref 3.5–5)
PROT SERPL-MCNC: 5.7 G/DL — LOW (ref 6–8)
RBC # BLD: 4.34 M/UL — LOW (ref 4.7–6.1)
RBC # FLD: 12.1 % — SIGNIFICANT CHANGE UP (ref 11.5–14.5)
SODIUM SERPL-SCNC: 140 MMOL/L — SIGNIFICANT CHANGE UP (ref 135–146)
SURGICAL PATHOLOGY STUDY: SIGNIFICANT CHANGE UP
WBC # BLD: 6.21 K/UL — SIGNIFICANT CHANGE UP (ref 4.8–10.8)
WBC # FLD AUTO: 6.21 K/UL — SIGNIFICANT CHANGE UP (ref 4.8–10.8)

## 2021-12-27 PROCEDURE — 99232 SBSQ HOSP IP/OBS MODERATE 35: CPT

## 2021-12-27 PROCEDURE — 99233 SBSQ HOSP IP/OBS HIGH 50: CPT

## 2021-12-27 RX ORDER — PANTOPRAZOLE SODIUM 20 MG/1
8 TABLET, DELAYED RELEASE ORAL
Qty: 80 | Refills: 0 | Status: DISCONTINUED | OUTPATIENT
Start: 2021-12-27 | End: 2021-12-27

## 2021-12-27 RX ORDER — MAGNESIUM SULFATE 500 MG/ML
2 VIAL (ML) INJECTION
Refills: 0 | Status: COMPLETED | OUTPATIENT
Start: 2021-12-27 | End: 2021-12-27

## 2021-12-27 RX ORDER — MAGNESIUM SULFATE 500 MG/ML
4 VIAL (ML) INJECTION ONCE
Refills: 0 | Status: DISCONTINUED | OUTPATIENT
Start: 2021-12-27 | End: 2021-12-27

## 2021-12-27 RX ORDER — POTASSIUM CHLORIDE 20 MEQ
40 PACKET (EA) ORAL ONCE
Refills: 0 | Status: COMPLETED | OUTPATIENT
Start: 2021-12-27 | End: 2021-12-27

## 2021-12-27 RX ORDER — PANTOPRAZOLE SODIUM 20 MG/1
40 TABLET, DELAYED RELEASE ORAL EVERY 12 HOURS
Refills: 0 | Status: DISCONTINUED | OUTPATIENT
Start: 2021-12-27 | End: 2021-12-30

## 2021-12-27 RX ADMIN — Medication 25 GRAM(S): at 12:38

## 2021-12-27 RX ADMIN — INSULIN GLARGINE 9 UNIT(S): 100 INJECTION, SOLUTION SUBCUTANEOUS at 22:09

## 2021-12-27 RX ADMIN — MYCOPHENOLATE MOFETIL 500 MILLIGRAM(S): 250 CAPSULE ORAL at 05:11

## 2021-12-27 RX ADMIN — Medication 4 UNIT(S): at 09:15

## 2021-12-27 RX ADMIN — HEPARIN SODIUM 5000 UNIT(S): 5000 INJECTION INTRAVENOUS; SUBCUTANEOUS at 22:10

## 2021-12-27 RX ADMIN — Medication 4 UNIT(S): at 17:56

## 2021-12-27 RX ADMIN — METHOCARBAMOL 500 MILLIGRAM(S): 500 TABLET, FILM COATED ORAL at 05:11

## 2021-12-27 RX ADMIN — Medication 25 GRAM(S): at 14:55

## 2021-12-27 RX ADMIN — TACROLIMUS 5 MILLIGRAM(S): 5 CAPSULE ORAL at 17:56

## 2021-12-27 RX ADMIN — Medication 2: at 09:14

## 2021-12-27 RX ADMIN — Medication 20 MILLIGRAM(S): at 05:11

## 2021-12-27 RX ADMIN — Medication 3: at 17:53

## 2021-12-27 RX ADMIN — Medication 5 MILLIGRAM(S): at 22:07

## 2021-12-27 RX ADMIN — Medication 4 UNIT(S): at 12:41

## 2021-12-27 RX ADMIN — Medication 25 MILLIGRAM(S): at 22:06

## 2021-12-27 RX ADMIN — HEPARIN SODIUM 5000 UNIT(S): 5000 INJECTION INTRAVENOUS; SUBCUTANEOUS at 05:11

## 2021-12-27 RX ADMIN — METHOCARBAMOL 500 MILLIGRAM(S): 500 TABLET, FILM COATED ORAL at 22:06

## 2021-12-27 RX ADMIN — Medication 25 MILLIGRAM(S): at 14:57

## 2021-12-27 RX ADMIN — HEPARIN SODIUM 5000 UNIT(S): 5000 INJECTION INTRAVENOUS; SUBCUTANEOUS at 14:56

## 2021-12-27 RX ADMIN — Medication 1 GRAM(S): at 12:42

## 2021-12-27 RX ADMIN — Medication 1 GRAM(S): at 00:38

## 2021-12-27 RX ADMIN — Medication 1 GRAM(S): at 05:11

## 2021-12-27 RX ADMIN — Medication 40 MILLIEQUIVALENT(S): at 10:37

## 2021-12-27 RX ADMIN — TACROLIMUS 5 MILLIGRAM(S): 5 CAPSULE ORAL at 05:11

## 2021-12-27 RX ADMIN — Medication 90 MILLIGRAM(S): at 05:11

## 2021-12-27 RX ADMIN — Medication 1 GRAM(S): at 17:55

## 2021-12-27 RX ADMIN — MYCOPHENOLATE MOFETIL 500 MILLIGRAM(S): 250 CAPSULE ORAL at 17:55

## 2021-12-27 RX ADMIN — Medication 4: at 12:40

## 2021-12-27 RX ADMIN — METHOCARBAMOL 500 MILLIGRAM(S): 500 TABLET, FILM COATED ORAL at 14:58

## 2021-12-27 RX ADMIN — Medication 25 MILLIGRAM(S): at 05:11

## 2021-12-27 NOTE — CONSULT NOTE ADULT - ASSESSMENT
#)Melena/Coffee ground emesis Upper GI Bleed: DD includes PUD vs esophageal varices vs AVM vs esophageal ulcer vs dieualfoy vs maliganancy  Hemodynamically stable   Basline Hb 14-15 admitted with 14 dropped to 12.7  ELEUTERIO + melena  No Antiplatelet/anticoagulation      Rec:  Keep NPO   Maintain active type and screen.  Trend Hb q8hrs and Keep it > 8, transfuse PRBC if necessary  Adequate Fluid resuscitation (SBP > 90)  IV Pantoprazole drip   Will plan for EGD today    
# ESRD (d/t DM) s/p DDRT in 2015 at [facility], creat baseline 1  # UGIB / s/p EGD, found esophagitis  # Active smoker    - non-oliguric  - creat improved to baseline  - c/o upper chest pain when eating    Recommendations:  - appreciate GI f/u  - repeat FK trough in AM 30 mins before AM dose (12/22 level high was taken after tacrolimus was administered)  - continue MMF  - cont current favian-hypertensives  - smoking cessation  - cont outpatient eszopiclone for inosomnia   - replete K to 4   - replete Mg to 2 po

## 2021-12-27 NOTE — PROGRESS NOTE ADULT - SUBJECTIVE AND OBJECTIVE BOX
Gastroenterology progress note:     Patient is a 66y old  Male who presents with a chief complaint of Dark stool (27 Dec 2021 10:17)       Admitted on: 12-21-21    We are following the patient for abdominal pain      Interval History: patient remains in pain, improved compared to admission.     PAST MEDICAL & SURGICAL HISTORY:  Kidney transplanted  Hypertension  DMII (diabetes mellitus, type 2)  No significant past surgical history      MEDICATIONS  (STANDING):  dextrose 50% Injectable 25 Gram(s) IV Push once  furosemide    Tablet 20 milliGRAM(s) Oral daily  heparin   Injectable 5000 Unit(s) SubCutaneous every 8 hours  influenza  Vaccine (HIGH DOSE) 0.7 milliLiter(s) IntraMuscular once  insulin glargine Injectable (LANTUS) 9 Unit(s) SubCutaneous at bedtime  insulin lispro (ADMELOG) corrective regimen sliding scale   SubCutaneous three times a day before meals  insulin lispro (ADMELOG) corrective regimen sliding scale   SubCutaneous at bedtime  insulin lispro Injectable (ADMELOG) 4 Unit(s) SubCutaneous three times a day before meals  magnesium sulfate  IVPB 2 Gram(s) IV Intermittent every 2 hours  melatonin 5 milliGRAM(s) Oral at bedtime  methocarbamol 500 milliGRAM(s) Oral every 8 hours  metoprolol tartrate 25 milliGRAM(s) Oral three times a day  mycophenolate mofetil 500 milliGRAM(s) Oral two times a day  NIFEdipine XL 90 milliGRAM(s) Oral daily  pantoprazole Infusion 8 mG/Hr (10 mL/Hr) IV Continuous <Continuous>  sucralfate suspension 1 Gram(s) Oral four times a day  tacrolimus 5 milliGRAM(s) Oral two times a day    MEDICATIONS  (PRN):  acetaminophen     Tablet .. 650 milliGRAM(s) Oral every 6 hours PRN Temp greater or equal to 38C (100.4F), Mild Pain (1 - 3)      Allergies  No Known Allergies      Review of Systems:   General: no fever  HEENT: no hemoptysis  Cardiovascular:  No Chest Pain, No Palpitations  Respiratory:  No Cough, No Dyspnea  Gastrointestinal:  As described in HPI  Hematology: no bruising or hematoma   Neurology: no new motor deficit  Skin: no new rash    Physical Examination:  T(C): 36.1 (12-27-21 @ 07:34), Max: 36.3 (12-26-21 @ 15:06)  HR: 55 (12-27-21 @ 07:34) (55 - 71)  BP: 128/59 (12-27-21 @ 07:34) (107/54 - 145/72)  RR: 18 (12-27-21 @ 07:34) (18 - 18)  SpO2: --      Constitutional: No acute distress.  Head: normocephalic  Neck: supple   Eyes: EOMI  Respiratory:  No signs of respiratory distress. Lung sounds are clear bilaterally.  Cardiovascular:  S1 S2, Regular rate and rhythm.  Abdominal: Abdomen is soft, symmetric, and non-tender without distention. There are no visible lesions or scars. Bowel sounds are present and normoactive in all four quadrants. No masses, hepatomegaly, or splenomegaly are noted.   Extremities: no pitting edema  Neurology: alert oriented *3, no asterixis   Skin: No rashes, No Jaundice.      Data: (reviewed by attending)                        13.5   6.21  )-----------( 166      ( 27 Dec 2021 06:56 )             39.0     Hgb trend:  13.5  12-27-21 @ 06:56        12-27    140  |  98  |  12  ----------------------------<  143<H>  3.1<L>   |  26  |  1.0    Ca    8.8      27 Dec 2021 06:56  Mg     1.4     12-27    TPro  5.7<L>  /  Alb  3.5  /  TBili  0.6  /  DBili  x   /  AST  15  /  ALT  11  /  AlkPhos  75  12-27    Liver panel trend:  TBili 0.6   /   AST 15   /   ALT 11   /   AlkP 75   /   Tptn 5.7   /   Alb 3.5    /   DBili --      12-27  TBili 0.8   /   AST 14   /   ALT 7   /   AlkP 83   /   Tptn 6.1   /   Alb 3.6    /   DBili --      12-24  TBili 0.7   /   AST 16   /   ALT 8   /   AlkP 90   /   Tptn 6.3   /   Alb 3.8    /   DBili --      12-23  TBili 0.6   /   AST 11   /   ALT 6   /   AlkP 88   /   Tptn 6.0   /   Alb 3.9    /   DBili --      12-22  TBili 0.7   /   AST 17   /   ALT 9   /   AlkP 95   /   Tptn 7.3   /   Alb 4.7    /   DBili --      12-21             Radiology: (reviewed by attending)       Gastroenterology progress note:     Patient is a 66y old  Male who presents with a chief complaint of Dark stool (27 Dec 2021 10:17)       Admitted on: 12-21-21    We are following the patient for abdominal pain      Interval History: patient remains in pain, improved compared to admission. tolerated diet this morning  no nausea no vomiting     PAST MEDICAL & SURGICAL HISTORY:  Kidney transplanted  Hypertension  DMII (diabetes mellitus, type 2)  No significant past surgical history      MEDICATIONS  (STANDING):  dextrose 50% Injectable 25 Gram(s) IV Push once  furosemide    Tablet 20 milliGRAM(s) Oral daily  heparin   Injectable 5000 Unit(s) SubCutaneous every 8 hours  influenza  Vaccine (HIGH DOSE) 0.7 milliLiter(s) IntraMuscular once  insulin glargine Injectable (LANTUS) 9 Unit(s) SubCutaneous at bedtime  insulin lispro (ADMELOG) corrective regimen sliding scale   SubCutaneous three times a day before meals  insulin lispro (ADMELOG) corrective regimen sliding scale   SubCutaneous at bedtime  insulin lispro Injectable (ADMELOG) 4 Unit(s) SubCutaneous three times a day before meals  magnesium sulfate  IVPB 2 Gram(s) IV Intermittent every 2 hours  melatonin 5 milliGRAM(s) Oral at bedtime  methocarbamol 500 milliGRAM(s) Oral every 8 hours  metoprolol tartrate 25 milliGRAM(s) Oral three times a day  mycophenolate mofetil 500 milliGRAM(s) Oral two times a day  NIFEdipine XL 90 milliGRAM(s) Oral daily  pantoprazole Infusion 8 mG/Hr (10 mL/Hr) IV Continuous <Continuous>  sucralfate suspension 1 Gram(s) Oral four times a day  tacrolimus 5 milliGRAM(s) Oral two times a day    MEDICATIONS  (PRN):  acetaminophen     Tablet .. 650 milliGRAM(s) Oral every 6 hours PRN Temp greater or equal to 38C (100.4F), Mild Pain (1 - 3)      Allergies  No Known Allergies      Review of Systems:   General: no fever  HEENT: no hemoptysis  Cardiovascular:  No Chest Pain, No Palpitations  Respiratory:  No Cough, No Dyspnea  Gastrointestinal:  As described in HPI  Hematology: no bruising or hematoma   Neurology: no new motor deficit  Skin: no new rash    Physical Examination:  T(C): 36.1 (12-27-21 @ 07:34), Max: 36.3 (12-26-21 @ 15:06)  HR: 55 (12-27-21 @ 07:34) (55 - 71)  BP: 128/59 (12-27-21 @ 07:34) (107/54 - 145/72)  RR: 18 (12-27-21 @ 07:34) (18 - 18)  SpO2: --      Constitutional: No acute distress.  Head: normocephalic  Neck: supple   Eyes: EOMI  Respiratory:  No signs of respiratory distress. Lung sounds are clear bilaterally.  Cardiovascular:  S1 S2, Regular rate and rhythm.  Abdominal: Abdomen is soft, symmetric, and non-tender without distention   Extremities: no pitting edema  Neurology: alert oriented *3   Skin: No Jaundice.      Data: (reviewed by attending)                        13.5   6.21  )-----------( 166      ( 27 Dec 2021 06:56 )             39.0     Hgb trend:  13.5  12-27-21 @ 06:56        12-27    140  |  98  |  12  ----------------------------<  143<H>  3.1<L>   |  26  |  1.0    Ca    8.8      27 Dec 2021 06:56  Mg     1.4     12-27    TPro  5.7<L>  /  Alb  3.5  /  TBili  0.6  /  DBili  x   /  AST  15  /  ALT  11  /  AlkPhos  75  12-27    Liver panel trend:  TBili 0.6   /   AST 15   /   ALT 11   /   AlkP 75   /   Tptn 5.7   /   Alb 3.5    /   DBili --      12-27  TBili 0.8   /   AST 14   /   ALT 7   /   AlkP 83   /   Tptn 6.1   /   Alb 3.6    /   DBili --      12-24  TBili 0.7   /   AST 16   /   ALT 8   /   AlkP 90   /   Tptn 6.3   /   Alb 3.8    /   DBili --      12-23  TBili 0.6   /   AST 11   /   ALT 6   /   AlkP 88   /   Tptn 6.0   /   Alb 3.9    /   DBili --      12-22  TBili 0.7   /   AST 17   /   ALT 9   /   AlkP 95   /   Tptn 7.3   /   Alb 4.7    /   DBili --      12-21

## 2021-12-27 NOTE — PROGRESS NOTE ADULT - ASSESSMENT
65 y/o M pmhx of renal transplant (On Tacrolimus & Mycophenolate), HTN, DM presents to ed for two episodes of dark stools w/ Nausea/Vomiting.     *Severe esophagitis likely secondary to tacrolimus and mycophenolate mofetil  s/p EGD 12/22 done for melena LA grade D esophagitis, erosive gastritis  Hemodynamically stable   Hb stable  tolerating some solid food today     Rec:  diet as tolerated, GERD diet   If tolerates lunch switch IV Pantoprazole drip to BID  continue carafate 1 gm 4 times/day   Avoid NSAID, smoking   Need Repeat endoscopy in 2 months to document healing of esophagitis   Follow up nephrology for immunosuppressive medications      *H pylori gastritis  -needs OP Rx. on discharge send quadruple therapy    -for questions text me on [employer] teams, call [phone] on weekdays before 5pm or call GI service at [phone]  after 5 pm and on weekends

## 2021-12-27 NOTE — CONSULT NOTE ADULT - SUBJECTIVE AND OBJECTIVE BOX
NEPHROLOGY CONSULTATION NOTE    FELICIANO ABAD  66y  Male  MRN-[id number]    CC:   Patient is a 66y old  Male who presents with a chief complaint of Dark stool (27 Dec 2021 13:16)      HPI:  Mr. Abad is a 66 year old (active smoker) Azeri Speaking male patient known to have: Baseline: AOx3; lives with wife at home; ambulates independently unassisted; has 14 steps at home, Hypertension. Home meds Lopressor 25mg TID, Nifedipine 90mg QD, Lasix 20mg QD, DM II. No hba1c. Home med Repgaglinide 1mg QD, History of kidney transplant in [place] in 2015. Patient unsure why. He follows with Dr Moreno located at [address] [place] [address]. Home meds Tacrolimus 5mg BID and Mycophenolate Mofetil 500mg BID and Chronic Back Pain. Presented to the ED for two episodes of dark stools yesterday, he also reports having 3 episodes of dark vomitus (large amount). Never had a prior episodes, denies Denies fever, chills, cp, sob, abd pain, hematuria, he is not on any AC, denies NSAID use, EtOH use. On admission: T(F): 97.6, HR: 85, BP: 142/69, RR: 20, SpO2: 96%. BUN 39 (baseline 14), Cr 1.6 ( Baseline 1.0),    (21 Dec 2021 17:30)      PAST MEDICAL & SURGICAL HISTORY:  Kidney transplanted    Hypertension    DMII (diabetes mellitus, type 2)    No significant past surgical history      Allergies:  No Known Allergies    Home Medications Reviewed  Hospital Medications:   MEDICATIONS  (STANDING):  dextrose 50% Injectable 25 Gram(s) IV Push once  furosemide    Tablet 20 milliGRAM(s) Oral daily  heparin   Injectable 5000 Unit(s) SubCutaneous every 8 hours  influenza  Vaccine (HIGH DOSE) 0.7 milliLiter(s) IntraMuscular once  insulin glargine Injectable (LANTUS) 9 Unit(s) SubCutaneous at bedtime  insulin lispro (ADMELOG) corrective regimen sliding scale   SubCutaneous three times a day before meals  insulin lispro (ADMELOG) corrective regimen sliding scale   SubCutaneous at bedtime  insulin lispro Injectable (ADMELOG) 4 Unit(s) SubCutaneous three times a day before meals  melatonin 5 milliGRAM(s) Oral at bedtime  methocarbamol 500 milliGRAM(s) Oral every 8 hours  metoprolol tartrate 25 milliGRAM(s) Oral three times a day  mycophenolate mofetil 500 milliGRAM(s) Oral two times a day  NIFEdipine XL 90 milliGRAM(s) Oral daily  pantoprazole Infusion 8 mG/Hr (10 mL/Hr) IV Continuous <Continuous>  sucralfate suspension 1 Gram(s) Oral four times a day  tacrolimus 5 milliGRAM(s) Oral two times a day    MEDICATIONS  (PRN):  acetaminophen     Tablet .. 650 milliGRAM(s) Oral every 6 hours PRN Temp greater or equal to 38C (100.4F), Mild Pain (1 - 3)    Home medications:  Home Medications:  Lasix 20 mg oral tablet: 1 tab(s) orally once a day (01 Nov 2021 22:14)  Lopressor 50 mg oral tablet: 25 milligram(s) orally 3 times a day (01 Nov 2021 22:14)  mycophenolate mofetil 500 mg oral tablet: 1 tab(s) orally 2 times a day (01 Nov 2021 22:15)  NIFEdipine 90 mg oral tablet, extended release: 1 tab(s) orally once a day (01 Nov 2021 22:14)  repaglinide 1 mg oral tablet: 1 tab(s) orally 3 times a day (before meals) (01 Nov 2021 22:14)  tacrolimus 5 mg oral capsule: 1 cap(s) orally every 12 hours (01 Nov 2021 22:15)      SOCIAL HISTORY:  Social History:      FAMILY HISTORY:      REVIEW OF SYSTEMS:   All other review of systems is negative unless indicated above.    VITALS:  T(F): 97 (12-27-21 @ 07:34), Max: 97.1 (12-27-21 @ 00:00)  HR: 55 (12-27-21 @ 07:34)  BP: 132/62 (12-27-21 @ 15:00)  RR: 18 (12-27-21 @ 07:34)  SpO2: --      I&O's Detail    26 Dec 2021 07:01  -  27 Dec 2021 07:00  --------------------------------------------------------  IN:  Total IN: 0 mL    OUT:    Voided (mL): 600 mL  Total OUT: 600 mL    Total NET: -600 mL            I&O's Summary    26 Dec 2021 07:01  -  27 Dec 2021 07:00  --------------------------------------------------------  IN: 0 mL / OUT: 600 mL / NET: -600 mL        PHYSICAL EXAM:  Gen: NAD  resp: CTAB  card: S1/S2  abd: soft  ext: no edema    LABS:    12-27    140  |  98  |  12  ----------------------------<  143<H>  3.1<L>   |  26  |  1.0    Ca    8.8      27 Dec 2021 06:56  Mg     1.4     12-27    TPro  5.7<L>  /  Alb  3.5  /  TBili  0.6  /  DBili      /  AST  15  /  ALT  11  /  AlkPhos  75  12-27    eGFR if Non African American: 77 mL/min/1.73M2 (12-27-21 @ 06:56)  eGFR if : 89 mL/min/1.73M2 (12-27-21 @ 06:56)  eGFR if Non African American: 87 mL/min/1.73M2 (12-24-21 @ 04:30)  eGFR if African American: 101 mL/min/1.73M2 (12-24-21 @ 04:30)    Creatinine Trend:   Creatinine, Serum: 1.0 mg/dL (12-27-21 @ 06:56)  Creatinine, Serum: 0.9 mg/dL (12-24-21 @ 04:30)  Creatinine, Serum: 0.9 mg/dL (12-23-21 @ 04:30)  Creatinine, Serum: 1.1 mg/dL (12-22-21 @ 06:30)  Creatinine, Serum: 1.6 mg/dL (12-21-21 @ 14:44)  Creatinine, Serum: 1.0 mg/dL (11-02-21 @ 04:30)  Creatinine, Serum: 1.0 mg/dL (11-01-21 @ 16:43)                            13.5   6.21  )-----------( 166      ( 27 Dec 2021 06:56 )             39.0     Mean Cell Volume: 89.9 fL (12-27-21 @ 06:56)    Urine Studies:              RADIOLOGY & ADDITIONAL STUDIES:        Xray Chest 1 View- PORTABLE-Routine:   EXAM:  XR CHEST PORTABLE ROUTINE 1V            PROCEDURE DATE:  11/02/2021            INTERPRETATION:  Clinical History / Reason for exam: Follow-up    Comparison : Chest radiograph September 23, 2019.    Technique/Positioning: Low lung volume.    Findings:    Support devices: None.    Cardiac/mediastinum/hilum: Stable    Lung parenchyma/Pleura: There are no acute infiltrates, pleural effusions or pneumothoraces. There is atelectatic change and/or scarring overlying the left CP angle, unchanged.    Skeleton/soft tissues: Stable    Impression:    Low lung volume.    No acute infiltrates.    Atelectatic change and/or scarring overlies the left CP angle, unchanged.    --- End of Report ---              KENNEY FLORES MD; Attending Radiologist  This document has been electronically signed. Nov 2 2021  7:02AM (11-02-21 @ 06:16)                     NEPHROLOGY CONSULTATION NOTE    FELICIANO ABAD  66y  Male  MRN-[id number]    CC:   Patient is a 66y old  Male who presents with a chief complaint of Dark stool (27 Dec 2021 13:16)      HPI:  Mr. Abad is a 66 year old (active smoker) Uzbek Speaking male patient known to have: Baseline: AOx3; lives with wife at home; ambulates independently unassisted; has 14 steps at home, Hypertension. Home meds Lopressor 25mg TID, Nifedipine 90mg QD, Lasix 20mg QD, DM II. No hba1c. Home med Repgaglinide 1mg QD, History of kidney transplant in [facility] in [place] in 2015. Patient unsure why. He follows with Dr Moreno located at [address] [place] [address]. Home meds Tacrolimus 5mg BID and Mycophenolate Mofetil 500mg BID and Chronic Back Pain. Presented to the ED for two episodes of dark stools yesterday, he also reports having 3 episodes of dark vomitus (large amount). Never had a prior episodes, denies Denies fever, chills, cp, sob, abd pain, hematuria, he is not on any AC, denies NSAID use, EtOH use. On admission: T(F): 97.6, HR: 85, BP: 142/69, RR: 20, SpO2: 96%. BUN 39 (baseline 14), Cr 1.6 ( Baseline 1.0),    (21 Dec 2021 17:30)      PAST MEDICAL & SURGICAL HISTORY:  Kidney transplanted    Hypertension    DMII (diabetes mellitus, type 2)    No significant past surgical history      Allergies:  No Known Allergies    Home Medications Reviewed  Hospital Medications:   MEDICATIONS  (STANDING):  dextrose 50% Injectable 25 Gram(s) IV Push once  furosemide    Tablet 20 milliGRAM(s) Oral daily  heparin   Injectable 5000 Unit(s) SubCutaneous every 8 hours  influenza  Vaccine (HIGH DOSE) 0.7 milliLiter(s) IntraMuscular once  insulin glargine Injectable (LANTUS) 9 Unit(s) SubCutaneous at bedtime  insulin lispro (ADMELOG) corrective regimen sliding scale   SubCutaneous three times a day before meals  insulin lispro (ADMELOG) corrective regimen sliding scale   SubCutaneous at bedtime  insulin lispro Injectable (ADMELOG) 4 Unit(s) SubCutaneous three times a day before meals  melatonin 5 milliGRAM(s) Oral at bedtime  methocarbamol 500 milliGRAM(s) Oral every 8 hours  metoprolol tartrate 25 milliGRAM(s) Oral three times a day  mycophenolate mofetil 500 milliGRAM(s) Oral two times a day  NIFEdipine XL 90 milliGRAM(s) Oral daily  pantoprazole Infusion 8 mG/Hr (10 mL/Hr) IV Continuous <Continuous>  sucralfate suspension 1 Gram(s) Oral four times a day  tacrolimus 5 milliGRAM(s) Oral two times a day    MEDICATIONS  (PRN):  acetaminophen     Tablet .. 650 milliGRAM(s) Oral every 6 hours PRN Temp greater or equal to 38C (100.4F), Mild Pain (1 - 3)    Home medications:  Home Medications:  Lasix 20 mg oral tablet: 1 tab(s) orally once a day (01 Nov 2021 22:14)  Lopressor 50 mg oral tablet: 25 milligram(s) orally 3 times a day (01 Nov 2021 22:14)  mycophenolate mofetil 500 mg oral tablet: 1 tab(s) orally 2 times a day (01 Nov 2021 22:15)  NIFEdipine 90 mg oral tablet, extended release: 1 tab(s) orally once a day (01 Nov 2021 22:14)  repaglinide 1 mg oral tablet: 1 tab(s) orally 3 times a day (before meals) (01 Nov 2021 22:14)  tacrolimus 5 mg oral capsule: 1 cap(s) orally every 12 hours (01 Nov 2021 22:15)      SOCIAL HISTORY:  Social History:      FAMILY HISTORY:      REVIEW OF SYSTEMS:  +dysphagia  denies n/v/d  denies sob/chest pain   All other review of systems is negative unless indicated above.    VITALS:  T(F): 97 (12-27-21 @ 07:34), Max: 97.1 (12-27-21 @ 00:00)  HR: 55 (12-27-21 @ 07:34)  BP: 132/62 (12-27-21 @ 15:00)  RR: 18 (12-27-21 @ 07:34)  SpO2: --      I&O's Detail    26 Dec 2021 07:01  -  27 Dec 2021 07:00  --------------------------------------------------------  IN:  Total IN: 0 mL    OUT:    Voided (mL): 600 mL  Total OUT: 600 mL    Total NET: -600 mL            I&O's Summary    26 Dec 2021 07:01  -  27 Dec 2021 07:00  --------------------------------------------------------  IN: 0 mL / OUT: 600 mL / NET: -600 mL        PHYSICAL EXAM:  Gen: NAD  resp: CTAB  card: S1/S2  abd: soft  ext: no edema    LABS:    12-27    140  |  98  |  12  ----------------------------<  143<H>  3.1<L>   |  26  |  1.0    Ca    8.8      27 Dec 2021 06:56  Mg     1.4     12-27    TPro  5.7<L>  /  Alb  3.5  /  TBili  0.6  /  DBili      /  AST  15  /  ALT  11  /  AlkPhos  75  12-27    eGFR if Non African American: 77 mL/min/1.73M2 (12-27-21 @ 06:56)  eGFR if : 89 mL/min/1.73M2 (12-27-21 @ 06:56)  eGFR if Non African American: 87 mL/min/1.73M2 (12-24-21 @ 04:30)  eGFR if African American: 101 mL/min/1.73M2 (12-24-21 @ 04:30)    Creatinine Trend:   Creatinine, Serum: 1.0 mg/dL (12-27-21 @ 06:56)  Creatinine, Serum: 0.9 mg/dL (12-24-21 @ 04:30)  Creatinine, Serum: 0.9 mg/dL (12-23-21 @ 04:30)  Creatinine, Serum: 1.1 mg/dL (12-22-21 @ 06:30)  Creatinine, Serum: 1.6 mg/dL (12-21-21 @ 14:44)  Creatinine, Serum: 1.0 mg/dL (11-02-21 @ 04:30)  Creatinine, Serum: 1.0 mg/dL (11-01-21 @ 16:43)                            13.5   6.21  )-----------( 166      ( 27 Dec 2021 06:56 )             39.0     Mean Cell Volume: 89.9 fL (12-27-21 @ 06:56)    Urine Studies:              RADIOLOGY & ADDITIONAL STUDIES:        Xray Chest 1 View- PORTABLE-Routine:   EXAM:  XR CHEST PORTABLE ROUTINE 1V            PROCEDURE DATE:  11/02/2021            INTERPRETATION:  Clinical History / Reason for exam: Follow-up    Comparison : Chest radiograph September 23, 2019.    Technique/Positioning: Low lung volume.    Findings:    Support devices: None.    Cardiac/mediastinum/hilum: Stable    Lung parenchyma/Pleura: There are no acute infiltrates, pleural effusions or pneumothoraces. There is atelectatic change and/or scarring overlying the left CP angle, unchanged.    Skeleton/soft tissues: Stable    Impression:    Low lung volume.    No acute infiltrates.    Atelectatic change and/or scarring overlies the left CP angle, unchanged.    --- End of Report ---              KENNEY FLORES MD; Attending Radiologist  This document has been electronically signed. Nov 2 2021  7:02AM (11-02-21 @ 06:16)

## 2021-12-28 LAB
ALBUMIN SERPL ELPH-MCNC: 3.6 G/DL — SIGNIFICANT CHANGE UP (ref 3.5–5.2)
ALP SERPL-CCNC: 74 U/L — SIGNIFICANT CHANGE UP (ref 30–115)
ALT FLD-CCNC: 14 U/L — SIGNIFICANT CHANGE UP (ref 0–41)
ANION GAP SERPL CALC-SCNC: 15 MMOL/L — HIGH (ref 7–14)
AST SERPL-CCNC: 17 U/L — SIGNIFICANT CHANGE UP (ref 0–41)
BASOPHILS # BLD AUTO: 0.02 K/UL — SIGNIFICANT CHANGE UP (ref 0–0.2)
BASOPHILS NFR BLD AUTO: 0.4 % — SIGNIFICANT CHANGE UP (ref 0–1)
BILIRUB SERPL-MCNC: 0.4 MG/DL — SIGNIFICANT CHANGE UP (ref 0.2–1.2)
BUN SERPL-MCNC: 13 MG/DL — SIGNIFICANT CHANGE UP (ref 10–20)
CALCIUM SERPL-MCNC: 9.1 MG/DL — SIGNIFICANT CHANGE UP (ref 8.5–10.1)
CHLORIDE SERPL-SCNC: 101 MMOL/L — SIGNIFICANT CHANGE UP (ref 98–110)
CO2 SERPL-SCNC: 26 MMOL/L — SIGNIFICANT CHANGE UP (ref 17–32)
CREAT SERPL-MCNC: 0.9 MG/DL — SIGNIFICANT CHANGE UP (ref 0.7–1.5)
EOSINOPHIL # BLD AUTO: 0.17 K/UL — SIGNIFICANT CHANGE UP (ref 0–0.7)
EOSINOPHIL NFR BLD AUTO: 3 % — SIGNIFICANT CHANGE UP (ref 0–8)
GLUCOSE BLDC GLUCOMTR-MCNC: 124 MG/DL — HIGH (ref 70–99)
GLUCOSE BLDC GLUCOMTR-MCNC: 162 MG/DL — HIGH (ref 70–99)
GLUCOSE BLDC GLUCOMTR-MCNC: 176 MG/DL — HIGH (ref 70–99)
GLUCOSE BLDC GLUCOMTR-MCNC: 226 MG/DL — HIGH (ref 70–99)
GLUCOSE SERPL-MCNC: 155 MG/DL — HIGH (ref 70–99)
HCT VFR BLD CALC: 39.5 % — LOW (ref 42–52)
HGB BLD-MCNC: 13.8 G/DL — LOW (ref 14–18)
IMM GRANULOCYTES NFR BLD AUTO: 0.2 % — SIGNIFICANT CHANGE UP (ref 0.1–0.3)
LYMPHOCYTES # BLD AUTO: 1.72 K/UL — SIGNIFICANT CHANGE UP (ref 1.2–3.4)
LYMPHOCYTES # BLD AUTO: 30.4 % — SIGNIFICANT CHANGE UP (ref 20.5–51.1)
MCHC RBC-ENTMCNC: 31.3 PG — HIGH (ref 27–31)
MCHC RBC-ENTMCNC: 34.9 G/DL — SIGNIFICANT CHANGE UP (ref 32–37)
MCV RBC AUTO: 89.6 FL — SIGNIFICANT CHANGE UP (ref 80–94)
MONOCYTES # BLD AUTO: 0.71 K/UL — HIGH (ref 0.1–0.6)
MONOCYTES NFR BLD AUTO: 12.6 % — HIGH (ref 1.7–9.3)
NEUTROPHILS # BLD AUTO: 3.02 K/UL — SIGNIFICANT CHANGE UP (ref 1.4–6.5)
NEUTROPHILS NFR BLD AUTO: 53.4 % — SIGNIFICANT CHANGE UP (ref 42.2–75.2)
NRBC # BLD: 0 /100 WBCS — SIGNIFICANT CHANGE UP (ref 0–0)
PLATELET # BLD AUTO: 174 K/UL — SIGNIFICANT CHANGE UP (ref 130–400)
POTASSIUM SERPL-MCNC: 3.2 MMOL/L — LOW (ref 3.5–5)
POTASSIUM SERPL-SCNC: 3.2 MMOL/L — LOW (ref 3.5–5)
PROT SERPL-MCNC: 5.8 G/DL — LOW (ref 6–8)
RBC # BLD: 4.41 M/UL — LOW (ref 4.7–6.1)
RBC # FLD: 12.3 % — SIGNIFICANT CHANGE UP (ref 11.5–14.5)
SODIUM SERPL-SCNC: 142 MMOL/L — SIGNIFICANT CHANGE UP (ref 135–146)
TROPONIN T SERPL-MCNC: <0.01 NG/ML — SIGNIFICANT CHANGE UP
TROPONIN T SERPL-MCNC: <0.01 NG/ML — SIGNIFICANT CHANGE UP
WBC # BLD: 5.65 K/UL — SIGNIFICANT CHANGE UP (ref 4.8–10.8)
WBC # FLD AUTO: 5.65 K/UL — SIGNIFICANT CHANGE UP (ref 4.8–10.8)

## 2021-12-28 PROCEDURE — 99232 SBSQ HOSP IP/OBS MODERATE 35: CPT

## 2021-12-28 PROCEDURE — 71045 X-RAY EXAM CHEST 1 VIEW: CPT | Mod: 26

## 2021-12-28 PROCEDURE — 93010 ELECTROCARDIOGRAM REPORT: CPT

## 2021-12-28 PROCEDURE — 99233 SBSQ HOSP IP/OBS HIGH 50: CPT

## 2021-12-28 RX ORDER — BENZOCAINE 10 %
1 GEL (GRAM) MUCOUS MEMBRANE ONCE
Refills: 0 | Status: DISCONTINUED | OUTPATIENT
Start: 2021-12-28 | End: 2021-12-30

## 2021-12-28 RX ORDER — POTASSIUM CHLORIDE 20 MEQ
20 PACKET (EA) ORAL
Refills: 0 | Status: COMPLETED | OUTPATIENT
Start: 2021-12-28 | End: 2021-12-28

## 2021-12-28 RX ORDER — PANTOPRAZOLE SODIUM 20 MG/1
40 TABLET, DELAYED RELEASE ORAL ONCE
Refills: 0 | Status: COMPLETED | OUTPATIENT
Start: 2021-12-28 | End: 2021-12-28

## 2021-12-28 RX ORDER — SODIUM CHLORIDE 9 MG/ML
1000 INJECTION INTRAMUSCULAR; INTRAVENOUS; SUBCUTANEOUS
Refills: 0 | Status: DISCONTINUED | OUTPATIENT
Start: 2021-12-28 | End: 2021-12-30

## 2021-12-28 RX ORDER — AMOXICILLIN 250 MG/5ML
1000 SUSPENSION, RECONSTITUTED, ORAL (ML) ORAL
Refills: 0 | Status: DISCONTINUED | OUTPATIENT
Start: 2021-12-28 | End: 2021-12-30

## 2021-12-28 RX ADMIN — Medication 50 MILLIEQUIVALENT(S): at 12:26

## 2021-12-28 RX ADMIN — METHOCARBAMOL 500 MILLIGRAM(S): 500 TABLET, FILM COATED ORAL at 21:40

## 2021-12-28 RX ADMIN — Medication 1 GRAM(S): at 12:45

## 2021-12-28 RX ADMIN — Medication 1 GRAM(S): at 06:13

## 2021-12-28 RX ADMIN — Medication 50 MILLIEQUIVALENT(S): at 09:45

## 2021-12-28 RX ADMIN — MYCOPHENOLATE MOFETIL 500 MILLIGRAM(S): 250 CAPSULE ORAL at 06:14

## 2021-12-28 RX ADMIN — TACROLIMUS 5 MILLIGRAM(S): 5 CAPSULE ORAL at 06:14

## 2021-12-28 RX ADMIN — Medication 1: at 16:57

## 2021-12-28 RX ADMIN — MYCOPHENOLATE MOFETIL 500 MILLIGRAM(S): 250 CAPSULE ORAL at 17:04

## 2021-12-28 RX ADMIN — Medication 20 MILLIGRAM(S): at 06:14

## 2021-12-28 RX ADMIN — Medication 1 GRAM(S): at 23:03

## 2021-12-28 RX ADMIN — Medication 25 MILLIGRAM(S): at 06:14

## 2021-12-28 RX ADMIN — Medication 25 MILLIGRAM(S): at 21:38

## 2021-12-28 RX ADMIN — PANTOPRAZOLE SODIUM 40 MILLIGRAM(S): 20 TABLET, DELAYED RELEASE ORAL at 17:03

## 2021-12-28 RX ADMIN — Medication 1 GRAM(S): at 17:03

## 2021-12-28 RX ADMIN — Medication 4 UNIT(S): at 11:49

## 2021-12-28 RX ADMIN — Medication 4 UNIT(S): at 16:58

## 2021-12-28 RX ADMIN — HEPARIN SODIUM 5000 UNIT(S): 5000 INJECTION INTRAVENOUS; SUBCUTANEOUS at 21:37

## 2021-12-28 RX ADMIN — Medication 2: at 11:49

## 2021-12-28 RX ADMIN — Medication 5 MILLIGRAM(S): at 21:37

## 2021-12-28 RX ADMIN — HEPARIN SODIUM 5000 UNIT(S): 5000 INJECTION INTRAVENOUS; SUBCUTANEOUS at 06:14

## 2021-12-28 RX ADMIN — HEPARIN SODIUM 5000 UNIT(S): 5000 INJECTION INTRAVENOUS; SUBCUTANEOUS at 16:57

## 2021-12-28 RX ADMIN — Medication 4 UNIT(S): at 07:50

## 2021-12-28 RX ADMIN — METHOCARBAMOL 500 MILLIGRAM(S): 500 TABLET, FILM COATED ORAL at 06:14

## 2021-12-28 RX ADMIN — Medication 50 MILLIEQUIVALENT(S): at 15:16

## 2021-12-28 RX ADMIN — PANTOPRAZOLE SODIUM 40 MILLIGRAM(S): 20 TABLET, DELAYED RELEASE ORAL at 09:21

## 2021-12-28 RX ADMIN — INSULIN GLARGINE 9 UNIT(S): 100 INJECTION, SOLUTION SUBCUTANEOUS at 21:37

## 2021-12-28 RX ADMIN — TACROLIMUS 5 MILLIGRAM(S): 5 CAPSULE ORAL at 17:03

## 2021-12-28 RX ADMIN — Medication 25 MILLIGRAM(S): at 17:01

## 2021-12-28 RX ADMIN — Medication 1 GRAM(S): at 00:06

## 2021-12-28 RX ADMIN — PANTOPRAZOLE SODIUM 40 MILLIGRAM(S): 20 TABLET, DELAYED RELEASE ORAL at 06:13

## 2021-12-28 RX ADMIN — Medication 1000 MILLIGRAM(S): at 17:03

## 2021-12-28 RX ADMIN — Medication 90 MILLIGRAM(S): at 06:14

## 2021-12-28 RX ADMIN — SODIUM CHLORIDE 50 MILLILITER(S): 9 INJECTION INTRAMUSCULAR; INTRAVENOUS; SUBCUTANEOUS at 13:53

## 2021-12-28 NOTE — PROGRESS NOTE ADULT - SUBJECTIVE AND OBJECTIVE BOX
Gastroenterology progress note:     Patient is a 66y old  Male who presents with a chief complaint of Dark stool (28 Dec 2021 10:50)       Admitted on: 12-21-21    We are following the patient for esophagitis      Interval History: pain improved, tolerating diet. no melena. no vomiting     PAST MEDICAL & SURGICAL HISTORY:  Kidney transplanted  Hypertension  DMII (diabetes mellitus, type 2)  No significant past surgical history    MEDICATIONS  (STANDING):  dextrose 50% Injectable 25 Gram(s) IV Push once  furosemide    Tablet 20 milliGRAM(s) Oral daily  heparin   Injectable 5000 Unit(s) SubCutaneous every 8 hours  influenza  Vaccine (HIGH DOSE) 0.7 milliLiter(s) IntraMuscular once  insulin glargine Injectable (LANTUS) 9 Unit(s) SubCutaneous at bedtime  insulin lispro (ADMELOG) corrective regimen sliding scale   SubCutaneous three times a day before meals  insulin lispro (ADMELOG) corrective regimen sliding scale   SubCutaneous at bedtime  insulin lispro Injectable (ADMELOG) 4 Unit(s) SubCutaneous three times a day before meals  melatonin 5 milliGRAM(s) Oral at bedtime  methocarbamol 500 milliGRAM(s) Oral every 8 hours  metoprolol tartrate 25 milliGRAM(s) Oral three times a day  mycophenolate mofetil 500 milliGRAM(s) Oral two times a day  NIFEdipine XL 90 milliGRAM(s) Oral daily  pantoprazole  Injectable 40 milliGRAM(s) IV Push every 12 hours  potassium chloride  20 mEq/100 mL IVPB 20 milliEquivalent(s) IV Intermittent every 2 hours  sucralfate suspension 1 Gram(s) Oral four times a day  tacrolimus 5 milliGRAM(s) Oral two times a day    MEDICATIONS  (PRN):  acetaminophen     Tablet .. 650 milliGRAM(s) Oral every 6 hours PRN Temp greater or equal to 38C (100.4F), Mild Pain (1 - 3)  aluminum hydroxide/magnesium hydroxide/simethicone Suspension 30 milliLiter(s) Oral every 4 hours PRN Dyspepsia      Allergies  No Known Allergies      Review of Systems:   General: no fever  HEENT: no hemoptysis  Cardiovascular:  No Chest Pain, No Palpitations  Respiratory:  No Cough, No Dyspnea  Gastrointestinal:  As described in HPI  Hematology: no bruising or hematoma   Neurology: no new motor deficit  Skin: no new rash    Physical Examination:  T(C): 36.6 (12-28-21 @ 07:45), Max: 37 (12-27-21 @ 16:53)  HR: 55 (12-28-21 @ 07:45) (55 - 69)  BP: 94/54 (12-28-21 @ 07:45) (94/54 - 141/65)  RR: 18 (12-28-21 @ 07:45) (18 - 18)  SpO2: --    Constitutional: No acute distress.  Head: normocephalic  Neck: supple   Eyes: EOMI  Respiratory:  No signs of respiratory distress. Lung sounds are clear bilaterally.  Cardiovascular:  S1 S2, Regular rate and rhythm.  Abdominal: Abdomen is soft, symmetric, and non-tender without distention. There are no visible lesions or scars. Bowel sounds are present and normoactive in all four quadrants. No masses, hepatomegaly, or splenomegaly are noted.   Extremities: no pitting edema  Neurology: alert oriented *3, no asterixis   Skin: No rashes, No Jaundice.      Data: (reviewed by attending)                        13.8   5.65  )-----------( 174      ( 28 Dec 2021 06:50 )             39.5     Hgb trend:  13.8  12-28-21 @ 06:50  13.5  12-27-21 @ 06:56        12-28    142  |  101  |  13  ----------------------------<  155<H>  3.2<L>   |  26  |  0.9    Ca    9.1      28 Dec 2021 06:50  Mg     1.4     12-27    TPro  5.8<L>  /  Alb  3.6  /  TBili  0.4  /  DBili  x   /  AST  17  /  ALT  14  /  AlkPhos  74  12-28    Liver panel trend:  TBili 0.4   /   AST 17   /   ALT 14   /   AlkP 74   /   Tptn 5.8   /   Alb 3.6    /   DBili --      12-28  TBili 0.6   /   AST 15   /   ALT 11   /   AlkP 75   /   Tptn 5.7   /   Alb 3.5    /   DBili --      12-27  TBili 0.8   /   AST 14   /   ALT 7   /   AlkP 83   /   Tptn 6.1   /   Alb 3.6    /   DBili --      12-24  TBili 0.7   /   AST 16   /   ALT 8   /   AlkP 90   /   Tptn 6.3   /   Alb 3.8    /   DBili --      12-23  TBili 0.6   /   AST 11   /   ALT 6   /   AlkP 88   /   Tptn 6.0   /   Alb 3.9    /   DBili --      12-22  TBili 0.7   /   AST 17   /   ALT 9   /   AlkP 95   /   Tptn 7.3   /   Alb 4.7    /   DBili --      12-21

## 2021-12-28 NOTE — RAPID RESPONSE TEAM SUMMARY - [id number]_GEN_ALL_CORE
GENERAL: NAD, lying in bed comfortably  CHEST/LUNG: Clear to auscultation bilaterally; No rales, rhonchi, wheezing, or rubs. Unlabored respirations  HEART: Regular rate and rhythm; No murmurs, rubs, or gallops  ABDOMEN: Bowel sounds present; Soft, Nontender, Nondistended.   EXTREMITIES:  2+ Peripheral Pulses, brisk capillary refill. No clubbing, cyanosis, or edema  NERVOUS SYSTEM:  Alert & Oriented X3, speech clear. No deficits     EKG no ischemic events.   troponins negative  Vitals WNL

## 2021-12-28 NOTE — PROGRESS NOTE ADULT - ASSESSMENT
# ESRD (d/t DM) s/p DDRT in 2015 at [facility], creat baseline 1  # UGIB / s/p EGD, found esophagitis  # Active smoker  #HTN - BP is low now-HOLD Nifedipine and LASIX, may need NS50 cc/hr if po is not possible (severe pain on swallowing)  - non-oliguric  - creat improved to baseline  - c/o upper chest pain when eating    Recommendations:  - appreciate GI f/u  - repeat FK trough in AM 30 mins before AM dose (12/22 level high was taken after tacrolimus was administered)  - continue MMF  - smoking cessation  - replete K to 4 and Mag to 1.8  - nutrition support  d/w HS

## 2021-12-28 NOTE — PROGRESS NOTE ADULT - ASSESSMENT
65 y/o M pmhx of renal transplant (On Tacrolimus & Mycophenolate), HTN, DM presents to ed for two episodes of dark stools w/ Nausea/Vomiting.     *Severe esophagitis likely secondary to tacrolimus and mycophenolate mofetil  s/p EGD 12/22 done for melena LA grade D esophagitis, erosive gastritis  Hemodynamically stable   Hb stable  tolerating solid food today     Rec:  diet as tolerated, GERD diet   continue PPI IV 40 mg BID, switch to po on discharge  continue carafate 1 gm 4 times/day   Avoid NSAID, smoking   Need Repeat endoscopy in 2 months to document healing of esophagitis   Follow up nephrology for immunosuppressive medications      *H pylori gastritis  -needs OP Rx. on discharge  -would not recommend quadruple therapy as tetracycline can cause pill induced esophagitis  -send clarithromycin 500 bid and amoxicillin 1 gram BID for total of 14 days in addition to the PPI BID      -for questions text me on  teams, call [phone] on weekdays before 5pm or call GI service at [phone]  after 5 pm and on weekends    67 y/o M pmhx of renal transplant (On Tacrolimus & Mycophenolate), HTN, DM presents to ed for two episodes of dark stools w/ Nausea/Vomiting.     *Severe esophagitis likely secondary to tacrolimus and mycophenolate mofetil  s/p EGD 12/22 done for melena LA grade D esophagitis, erosive gastritis  Hemodynamically stable   Hb stable  tolerating solid food today     Rec:  diet as tolerated, GERD diet   continue PPI IV 40 mg BID, switch to po on discharge  continue carafate 1 gm 4 times/day   Avoid NSAID, smoking   Need Repeat endoscopy in 2 months to document healing of esophagitis   Follow up nephrology for immunosuppressive medications      *H pylori gastritis  -needs OP Rx. on discharge  -would not recommend quadruple therapy as tetracycline can cause pill induced esophagitis  -send clarithromycin 500 bid and amoxicillin 1 gram BID for total of 14 days in addition to the PPI BID     needs OP follow up to document H pylori eradication and to repeat EGD    -call as needed, [phone] during weekdays till 5pm and call GI service after 5pm and on weekends [phone]  -Follow up with our [facility] located at [address] [place] [address]. Phone Number: [phone]

## 2021-12-28 NOTE — PROGRESS NOTE ADULT - SUBJECTIVE AND OBJECTIVE BOX
Nephrology progress note    Patient is seen and examined, events over the last 24 h noted .  c/o dysphagia, unable to eat, BP low normal  Allergies:  No Known Allergies    Hospital Medications:   MEDICATIONS  (STANDING):  dextrose 50% Injectable 25 Gram(s) IV Push once  furosemide    Tablet 20 milliGRAM(s) Oral daily  heparin   Injectable 5000 Unit(s) SubCutaneous every 8 hours  influenza  Vaccine (HIGH DOSE) 0.7 milliLiter(s) IntraMuscular once  insulin glargine Injectable (LANTUS) 9 Unit(s) SubCutaneous at bedtime  insulin lispro (ADMELOG) corrective regimen sliding scale   SubCutaneous three times a day before meals  insulin lispro (ADMELOG) corrective regimen sliding scale   SubCutaneous at bedtime  insulin lispro Injectable (ADMELOG) 4 Unit(s) SubCutaneous three times a day before meals  melatonin 5 milliGRAM(s) Oral at bedtime  methocarbamol 500 milliGRAM(s) Oral every 8 hours  metoprolol tartrate 25 milliGRAM(s) Oral three times a day  mycophenolate mofetil 500 milliGRAM(s) Oral two times a day  NIFEdipine XL 90 milliGRAM(s) Oral daily  pantoprazole  Injectable 40 milliGRAM(s) IV Push every 12 hours  potassium chloride  20 mEq/100 mL IVPB 20 milliEquivalent(s) IV Intermittent every 2 hours  sucralfate suspension 1 Gram(s) Oral four times a day  tacrolimus 5 milliGRAM(s) Oral two times a day        VITALS:  T(F): 97.9 (12-28-21 @ 07:45), Max: 98.6 (12-27-21 @ 16:53)  HR: 55 (12-28-21 @ 07:45)  BP: 94/54 (12-28-21 @ 07:45)  RR: 18 (12-28-21 @ 07:45)  SpO2: --  Wt(kg): --    12-26 @ 07:01  -  12-27 @ 07:00  --------------------------------------------------------  IN: 0 mL / OUT: 600 mL / NET: -600 mL    12-27 @ 07:01  -  12-28 @ 07:00  --------------------------------------------------------  IN: 0 mL / OUT: 300 mL / NET: -300 mL          PHYSICAL EXAM:  Constitutional: NAD  HEENT: anicteric sclera,   Neck: No JVD  Respiratory: CTA  Cardiovascular: S1, S2, RRR  Gastrointestinal: BS+, soft, NT/ND  Extremities:  No peripheral edema  Neurological: A/O x 3  : No CVA tenderness. No lipscomb.   Skin: No rashes  Vascular Access:    LABS:  12-28    142  |  101  |  13  ----------------------------<  155<H>  3.2<L>   |  26  |  0.9    Ca    9.1      28 Dec 2021 06:50  Mg     1.4     12-27    TPro  5.8<L>  /  Alb  3.6  /  TBili  0.4  /  DBili      /  AST  17  /  ALT  14  /  AlkPhos  74  12-28                          13.8   5.65  )-----------( 174      ( 28 Dec 2021 06:50 )             39.5       Urine Studies:      RADIOLOGY & ADDITIONAL STUDIES:  < from: Xray Chest 1 View AP/PA (12.28.21 @ 10:24) >  Low lung volumes with bibasilar opacities/atelectasis.    < end of copied text >

## 2021-12-28 NOTE — PROGRESS NOTE ADULT - SUBJECTIVE AND OBJECTIVE BOX
FELICIANO JEFFERY 66y Male  MRN#: [id number]   Hospital Day: 7d    SUBJECTIVE  Patient is a 66y old Male who presents with a chief complaint of Dark stool (27 Dec 2021 15:36)  Currently admitted to medicine with the primary diagnosis of Dark stools    INTERVAL HPI AND OVERNIGHT EVENTS:  Patient was examined and seen at bedside. This morning he is resting comfortably in bed and reports no issues or overnight events.    OBJECTIVE  PAST MEDICAL & SURGICAL HISTORY  Kidney transplanted  Hypertension  DMII (diabetes mellitus, type 2)  No significant past surgical history    ALLERGIES:  No Known Allergies    MEDICATIONS:  STANDING MEDICATIONS  dextrose 50% Injectable 25 Gram(s) IV Push once  furosemide    Tablet 20 milliGRAM(s) Oral daily  heparin   Injectable 5000 Unit(s) SubCutaneous every 8 hours  influenza  Vaccine (HIGH DOSE) 0.7 milliLiter(s) IntraMuscular once  insulin glargine Injectable (LANTUS) 9 Unit(s) SubCutaneous at bedtime  insulin lispro (ADMELOG) corrective regimen sliding scale   SubCutaneous three times a day before meals  insulin lispro (ADMELOG) corrective regimen sliding scale   SubCutaneous at bedtime  insulin lispro Injectable (ADMELOG) 4 Unit(s) SubCutaneous three times a day before meals  melatonin 5 milliGRAM(s) Oral at bedtime  methocarbamol 500 milliGRAM(s) Oral every 8 hours  metoprolol tartrate 25 milliGRAM(s) Oral three times a day  mycophenolate mofetil 500 milliGRAM(s) Oral two times a day  NIFEdipine XL 90 milliGRAM(s) Oral daily  pantoprazole  Injectable 40 milliGRAM(s) IV Push every 12 hours  potassium chloride  20 mEq/100 mL IVPB 20 milliEquivalent(s) IV Intermittent every 2 hours  sucralfate suspension 1 Gram(s) Oral four times a day  tacrolimus 5 milliGRAM(s) Oral two times a day    PRN MEDICATIONS  acetaminophen     Tablet .. 650 milliGRAM(s) Oral every 6 hours PRN  aluminum hydroxide/magnesium hydroxide/simethicone Suspension 30 milliLiter(s) Oral every 4 hours PRN      VITAL SIGNS: Last 24 Hours  T(C): 36.6 (28 Dec 2021 07:45), Max: 37 (27 Dec 2021 16:53)  T(F): 97.9 (28 Dec 2021 07:45), Max: 98.6 (27 Dec 2021 16:53)  HR: 55 (28 Dec 2021 07:45) (55 - 69)  BP: 94/54 (28 Dec 2021 07:45) (94/54 - 141/65)  BP(mean): --  RR: 18 (28 Dec 2021 07:45) (18 - 18)  SpO2: --    LABS:                        13.8   5.65  )-----------( 174      ( 28 Dec 2021 06:50 )             39.5     12-28    142  |  101  |  13  ----------------------------<  155<H>  3.2<L>   |  26  |  0.9    Ca    9.1      28 Dec 2021 06:50  Mg     1.4     12-27    TPro  5.8<L>  /  Alb  3.6  /  TBili  0.4  /  DBili  x   /  AST  17  /  ALT  14  /  AlkPhos  74  12-28          Troponin T, Serum: <0.01 ng/mL (12-28-21 @ 08:06)      CARDIAC MARKERS ( 28 Dec 2021 08:06 )  x     / <0.01 ng/mL / x     / x     / x        PHYSICAL EXAM:  CONSTITUTIONAL: No acute distress, well-developed, well-groomed, AAOx3  PULMONARY: Clear to auscultation bilaterally  CARDIOVASCULAR: Regular rate and rhythm  GASTROINTESTINAL: Soft, non-tender, non-distended  MUSCULOSKELETAL:  no edema  NEUROLOGY: non-focal  SKIN: No rashes or lesions    ASSESSMENT & PLAN  65 yo male  pmh of renal transplant, HTN and DM presents to ed for two episodes of dark stools a/w nausea.    # Severe Esophagitis w/ melena  - EGD done 12/22: Grade D esophagitis compatible with nonspecific erosive esophagitis.      Erosions in the stomach compatible with erosive gastritis. (Biopsy).    -Prominent mucosal fold was noted in the duodenum cold forceps biopsy was performed for histology. .    - c/w PPI BID   - c/w Carafate  - advance diet to soft as tolerated  - avoid NSAIDs, smoking cessation    - f/u with GI biopsy results  - Hb stable  - patient reported severe chest pain today, troponin negative, EKG with no ischemic changes     # H/o Renal Transplant  - Home meds Tacrolimus 5mg BID and Mycophenolate Mofetil 500mg BID. Not on prednisone  - f/u nephrology   - tacrolimus trough and Mycophenolate levels WNL   - Monitor BUN and Cr, Avoid nephrotoxic agents and contrast exposure if possible    # DM II  - A1c 11/2/21 6.9  - Home med Repaglinide 1mg QD  - currently on lantus 12 units QHS, AM hypoglycemia noted, will decrease Lantus to 8 units QHS.  - monitor CBG, continue insulin lispro sliding scale     Hypomagnesemia  Hypokalemia  - s/p Oral and IV repletion  - follow up AM BMP & Mg     # Hypertension   - c/w  home meds Lopressor 25mg TID, Nifedipine 90mg QD, Lasix 20mg QD

## 2021-12-28 NOTE — RAPID RESPONSE TEAM SUMMARY - NSSITUATIONBACKGROUNDRRT_GEN_ALL_CORE
Patient was complaining about chest pain. Pain started after eating breakfast. Pain was decribed as shap epigastric with no radiation. No SOB or palpitation. Pain resolved within 1 minute of evaluation.

## 2021-12-29 LAB
ALBUMIN SERPL ELPH-MCNC: 3.6 G/DL — SIGNIFICANT CHANGE UP (ref 3.5–5.2)
ALP SERPL-CCNC: 78 U/L — SIGNIFICANT CHANGE UP (ref 30–115)
ALT FLD-CCNC: 17 U/L — SIGNIFICANT CHANGE UP (ref 0–41)
ANION GAP SERPL CALC-SCNC: 13 MMOL/L — SIGNIFICANT CHANGE UP (ref 7–14)
AST SERPL-CCNC: 19 U/L — SIGNIFICANT CHANGE UP (ref 0–41)
BASOPHILS # BLD AUTO: 0.03 K/UL — SIGNIFICANT CHANGE UP (ref 0–0.2)
BASOPHILS NFR BLD AUTO: 0.5 % — SIGNIFICANT CHANGE UP (ref 0–1)
BILIRUB SERPL-MCNC: 0.4 MG/DL — SIGNIFICANT CHANGE UP (ref 0.2–1.2)
BUN SERPL-MCNC: 14 MG/DL — SIGNIFICANT CHANGE UP (ref 10–20)
CALCIUM SERPL-MCNC: 8.9 MG/DL — SIGNIFICANT CHANGE UP (ref 8.5–10.1)
CHLORIDE SERPL-SCNC: 103 MMOL/L — SIGNIFICANT CHANGE UP (ref 98–110)
CO2 SERPL-SCNC: 23 MMOL/L — SIGNIFICANT CHANGE UP (ref 17–32)
CREAT SERPL-MCNC: 1 MG/DL — SIGNIFICANT CHANGE UP (ref 0.7–1.5)
EOSINOPHIL # BLD AUTO: 0.26 K/UL — SIGNIFICANT CHANGE UP (ref 0–0.7)
EOSINOPHIL NFR BLD AUTO: 4.5 % — SIGNIFICANT CHANGE UP (ref 0–8)
GLUCOSE BLDC GLUCOMTR-MCNC: 107 MG/DL — HIGH (ref 70–99)
GLUCOSE BLDC GLUCOMTR-MCNC: 129 MG/DL — HIGH (ref 70–99)
GLUCOSE BLDC GLUCOMTR-MCNC: 259 MG/DL — HIGH (ref 70–99)
GLUCOSE BLDC GLUCOMTR-MCNC: 366 MG/DL — HIGH (ref 70–99)
GLUCOSE SERPL-MCNC: 126 MG/DL — HIGH (ref 70–99)
HCT VFR BLD CALC: 40.2 % — LOW (ref 42–52)
HGB BLD-MCNC: 13.7 G/DL — LOW (ref 14–18)
IMM GRANULOCYTES NFR BLD AUTO: 0.3 % — SIGNIFICANT CHANGE UP (ref 0.1–0.3)
LYMPHOCYTES # BLD AUTO: 1.97 K/UL — SIGNIFICANT CHANGE UP (ref 1.2–3.4)
LYMPHOCYTES # BLD AUTO: 34 % — SIGNIFICANT CHANGE UP (ref 20.5–51.1)
MCHC RBC-ENTMCNC: 30.9 PG — SIGNIFICANT CHANGE UP (ref 27–31)
MCHC RBC-ENTMCNC: 34.1 G/DL — SIGNIFICANT CHANGE UP (ref 32–37)
MCV RBC AUTO: 90.7 FL — SIGNIFICANT CHANGE UP (ref 80–94)
MONOCYTES # BLD AUTO: 0.7 K/UL — HIGH (ref 0.1–0.6)
MONOCYTES NFR BLD AUTO: 12.1 % — HIGH (ref 1.7–9.3)
NEUTROPHILS # BLD AUTO: 2.82 K/UL — SIGNIFICANT CHANGE UP (ref 1.4–6.5)
NEUTROPHILS NFR BLD AUTO: 48.6 % — SIGNIFICANT CHANGE UP (ref 42.2–75.2)
NRBC # BLD: 0 /100 WBCS — SIGNIFICANT CHANGE UP (ref 0–0)
PLATELET # BLD AUTO: 189 K/UL — SIGNIFICANT CHANGE UP (ref 130–400)
POTASSIUM SERPL-MCNC: 3.9 MMOL/L — SIGNIFICANT CHANGE UP (ref 3.5–5)
POTASSIUM SERPL-SCNC: 3.9 MMOL/L — SIGNIFICANT CHANGE UP (ref 3.5–5)
PROT SERPL-MCNC: 5.8 G/DL — LOW (ref 6–8)
RBC # BLD: 4.43 M/UL — LOW (ref 4.7–6.1)
RBC # FLD: 12.6 % — SIGNIFICANT CHANGE UP (ref 11.5–14.5)
SODIUM SERPL-SCNC: 139 MMOL/L — SIGNIFICANT CHANGE UP (ref 135–146)
TACROLIMUS SERPL-MCNC: 22 NG/ML — SIGNIFICANT CHANGE UP
WBC # BLD: 5.8 K/UL — SIGNIFICANT CHANGE UP (ref 4.8–10.8)
WBC # FLD AUTO: 5.8 K/UL — SIGNIFICANT CHANGE UP (ref 4.8–10.8)

## 2021-12-29 PROCEDURE — 99232 SBSQ HOSP IP/OBS MODERATE 35: CPT

## 2021-12-29 RX ORDER — POTASSIUM CHLORIDE 20 MEQ
40 PACKET (EA) ORAL ONCE
Refills: 0 | Status: COMPLETED | OUTPATIENT
Start: 2021-12-29 | End: 2021-12-29

## 2021-12-29 RX ADMIN — Medication 1 GRAM(S): at 06:19

## 2021-12-29 RX ADMIN — METHOCARBAMOL 500 MILLIGRAM(S): 500 TABLET, FILM COATED ORAL at 12:56

## 2021-12-29 RX ADMIN — Medication 5: at 18:00

## 2021-12-29 RX ADMIN — Medication 1000 MILLIGRAM(S): at 17:59

## 2021-12-29 RX ADMIN — HEPARIN SODIUM 5000 UNIT(S): 5000 INJECTION INTRAVENOUS; SUBCUTANEOUS at 06:19

## 2021-12-29 RX ADMIN — MYCOPHENOLATE MOFETIL 500 MILLIGRAM(S): 250 CAPSULE ORAL at 06:19

## 2021-12-29 RX ADMIN — METHOCARBAMOL 500 MILLIGRAM(S): 500 TABLET, FILM COATED ORAL at 06:18

## 2021-12-29 RX ADMIN — INSULIN GLARGINE 9 UNIT(S): 100 INJECTION, SOLUTION SUBCUTANEOUS at 22:05

## 2021-12-29 RX ADMIN — Medication 3: at 12:34

## 2021-12-29 RX ADMIN — HEPARIN SODIUM 5000 UNIT(S): 5000 INJECTION INTRAVENOUS; SUBCUTANEOUS at 22:05

## 2021-12-29 RX ADMIN — TACROLIMUS 5 MILLIGRAM(S): 5 CAPSULE ORAL at 22:06

## 2021-12-29 RX ADMIN — Medication 4 UNIT(S): at 12:34

## 2021-12-29 RX ADMIN — METHOCARBAMOL 500 MILLIGRAM(S): 500 TABLET, FILM COATED ORAL at 22:07

## 2021-12-29 RX ADMIN — Medication 1 GRAM(S): at 22:06

## 2021-12-29 RX ADMIN — HEPARIN SODIUM 5000 UNIT(S): 5000 INJECTION INTRAVENOUS; SUBCUTANEOUS at 12:56

## 2021-12-29 RX ADMIN — Medication 25 MILLIGRAM(S): at 12:56

## 2021-12-29 RX ADMIN — Medication 25 MILLIGRAM(S): at 06:19

## 2021-12-29 RX ADMIN — PANTOPRAZOLE SODIUM 40 MILLIGRAM(S): 20 TABLET, DELAYED RELEASE ORAL at 18:11

## 2021-12-29 RX ADMIN — Medication 40 MILLIEQUIVALENT(S): at 12:57

## 2021-12-29 RX ADMIN — PANTOPRAZOLE SODIUM 40 MILLIGRAM(S): 20 TABLET, DELAYED RELEASE ORAL at 06:19

## 2021-12-29 RX ADMIN — Medication 25 MILLIGRAM(S): at 22:07

## 2021-12-29 RX ADMIN — MYCOPHENOLATE MOFETIL 500 MILLIGRAM(S): 250 CAPSULE ORAL at 17:59

## 2021-12-29 RX ADMIN — Medication 1000 MILLIGRAM(S): at 06:18

## 2021-12-29 RX ADMIN — TACROLIMUS 5 MILLIGRAM(S): 5 CAPSULE ORAL at 06:18

## 2021-12-29 RX ADMIN — Medication 4 UNIT(S): at 17:59

## 2021-12-29 RX ADMIN — Medication 1 GRAM(S): at 18:07

## 2021-12-29 RX ADMIN — Medication 5 MILLIGRAM(S): at 22:06

## 2021-12-29 NOTE — PROGRESS NOTE ADULT - ASSESSMENT
# ESRD (d/t DM) s/p DDRT in 2015 at [facility], creat baseline 1  # UGIB / s/p EGD, found esophagitis  # Active smoker  #HTN - BP is low now-HOLD Nifedipine and LASIX, may need NS50 cc/hr if po is not possible (severe pain on swallowing)  - non-oliguric  - creat improved to baseline  - repeat FK trough  today in AM 30 mins before AM dose , goal 5 to 6   - continue MMF  - replete K to 4 / check magnesium   - nutrition support

## 2021-12-29 NOTE — PROGRESS NOTE ADULT - ASSESSMENT
65 yo male  pmh of renal transplant, HTN and DM presents to ed for two episodes of dark stools a/w nausea.    # Severe Esophagitis w/ melena  - EGD done 12/22: Grade D esophagitis compatible with nonspecific erosive esophagitis.      Erosions in the stomach compatible with erosive gastritis. (Biopsy).    -Prominent mucosal fold was noted in the duodenum cold forceps biopsy was performed for histology. .    - c/w PPI BID   - c/w Carafate  - advance diet to soft as tolerated  - avoid NSAIDs, smoking cessation    - f/u with GI biopsy results  - Hb stable  - patient reported severe chest pain today, troponin negative, EKG with no ischemic changes     # H/o Renal Transplant  - Home meds Tacrolimus 5mg BID and Mycophenolate Mofetil 500mg BID. Not on prednisone  - f/u nephrology   - tacrolimus trough and Mycophenolate levels WNL   - Monitor BUN and Cr, Avoid nephrotoxic agents and contrast exposure if possible    # DM II  - A1c 11/2/21 6.9  - Home med Repaglinide 1mg QD  - currently on lantus 12 units QHS, AM hypoglycemia noted, will decrease Lantus to 8 units QHS.  - monitor CBG, continue insulin lispro sliding scale     Hypomagnesemia  Hypokalemia  - s/p Oral and IV repletion  - follow up AM BMP & Mg     # Hypertension   - c/w  home meds Lopressor 25mg TID, Nifedipine 90mg QD, Lasix 20mg QD   67 yo male  pmh of renal transplant, HTN and DM presents to ed for two episodes of dark stools a/w nausea.    # Severe Esophagitis w/ melena  - EGD done 12/22: Grade D esophagitis compatible with nonspecific erosive esophagitis.      Erosions in the stomach compatible with erosive gastritis. (Biopsy).    -Prominent mucosal fold was noted in the duodenum cold forceps biopsy was performed for histology. .    - c/w PPI BID   - c/w Carafate  - advance diet to soft as tolerated  - avoid NSAIDs, smoking cessation    - f/u with GI biopsy results  - Hb stable  - s/p RR (12/28) for  severe chest pain today, troponin negative, EKG with no ischemic changes     # H/o Renal Transplant  - Home meds Tacrolimus 5mg BID and Mycophenolate Mofetil 500mg BID. Not on prednisone  - f/u nephrology   - tacrolimus trough and Mycophenolate levels WNL   - Measure Tacrolimus trough today 30mins before tacrolimus dose (ordered for 4pm)  - Monitor BUN and Cr, Avoid nephrotoxic agents and contrast exposure if possible    # DM II  - A1c 11/2/21 6.9  - Home med Repaglinide 1mg QD  - currently on lantus 9U at bedtime, lispro 4U   - monitor CBG, continue insulin lispro sliding scale     Hypomagnesemia  Hypokalemia  - s/p Oral and IV repletion  - follow up AM BMP & Mg     # Hypertension   - c/w  home meds Lopressor 25mg TID, Nifedipine 90mg QD, Lasix 20mg QD      #DVT PPx; Heparin subq   #GI PPx: PPI  #Diet: soft

## 2021-12-29 NOTE — PROGRESS NOTE ADULT - SUBJECTIVE AND OBJECTIVE BOX
HPI  Patient is a 66y old Male who presents with a chief complaint of Dark stool (28 Dec 2021 11:37)    Currently admitted to medicine with the primary diagnosis of Dark stools       Today is hospital day 8d.     INTERVAL HPI / OVERNIGHT EVENTS:  Patient was examined and seen at bedside. This morning he is resting comfortably in bed and reports no new issues or overnight events.     ROS: Otherwise unremarkable     PAST MEDICAL & SURGICAL HISTORY  Kidney transplanted    Hypertension    DMII (diabetes mellitus, type 2)    No significant past surgical history      ALLERGIES  No Known Allergies    MEDICATIONS  STANDING MEDICATIONS  amoxicillin 1000 milliGRAM(s) Oral two times a day  benzocaine 20% Spray 1 Spray(s) Topical once  dextrose 50% Injectable 25 Gram(s) IV Push once  heparin   Injectable 5000 Unit(s) SubCutaneous every 8 hours  influenza  Vaccine (HIGH DOSE) 0.7 milliLiter(s) IntraMuscular once  insulin glargine Injectable (LANTUS) 9 Unit(s) SubCutaneous at bedtime  insulin lispro (ADMELOG) corrective regimen sliding scale   SubCutaneous three times a day before meals  insulin lispro (ADMELOG) corrective regimen sliding scale   SubCutaneous at bedtime  insulin lispro Injectable (ADMELOG) 4 Unit(s) SubCutaneous three times a day before meals  melatonin 5 milliGRAM(s) Oral at bedtime  methocarbamol 500 milliGRAM(s) Oral every 8 hours  metoprolol tartrate 25 milliGRAM(s) Oral three times a day  mycophenolate mofetil 500 milliGRAM(s) Oral two times a day  pantoprazole  Injectable 40 milliGRAM(s) IV Push every 12 hours  sodium chloride 0.9%. 1000 milliLiter(s) IV Continuous <Continuous>  sucralfate suspension 1 Gram(s) Oral four times a day  tacrolimus 5 milliGRAM(s) Oral two times a day    PRN MEDICATIONS  acetaminophen     Tablet .. 650 milliGRAM(s) Oral every 6 hours PRN  aluminum hydroxide/magnesium hydroxide/simethicone Suspension 30 milliLiter(s) Oral every 4 hours PRN    VITALS:  T(F): 99  HR: 62  BP: 174/74  RR: 18  SpO2: --    PHYSICAL EXAM  GEN: NAD, Resting comfortably in bed  PULM: Clear to auscultation bilaterally, No wheezes  CVS: Regular rate and rhythm, S1-S2, no murmurs  ABD: Soft, non-tender, non-distended, no guarding  EXT: No edema  NEURO: A&Ox3, no focal deficits    LABS                        13.8   5.65  )-----------( 174      ( 28 Dec 2021 06:50 )             39.5     12-28    142  |  101  |  13  ----------------------------<  155<H>  3.2<L>   |  26  |  0.9    Ca    9.1      28 Dec 2021 06:50    TPro  5.8<L>  /  Alb  3.6  /  TBili  0.4  /  DBili  x   /  AST  17  /  ALT  14  /  AlkPhos  74  12-28          Troponin T, Serum: <0.01 ng/mL (12-28-21 @ 12:31)  Troponin T, Serum: <0.01 ng/mL (12-28-21 @ 08:06)      CARDIAC MARKERS ( 28 Dec 2021 12:31 )  x     / <0.01 ng/mL / x     / x     / x      CARDIAC MARKERS ( 28 Dec 2021 08:06 )  x     / <0.01 ng/mL / x     / x     / x          RADIOLOGY     HPI  Patient is a 66y old Male who presents with a chief complaint of Dark stool (28 Dec 2021 11:37)    Currently admitted to medicine with the primary diagnosis of Dark stools       Today is hospital day 8d.     INTERVAL HPI / OVERNIGHT EVENTS:  Patient was examined and seen at bedside. This morning he is resting comfortably in bed and reports no new issues or overnight events. He denies any chest pain, nausea, vomiting, diarrhea, constipation.     ROS: Otherwise unremarkable     PAST MEDICAL & SURGICAL HISTORY  Kidney transplanted    Hypertension    DMII (diabetes mellitus, type 2)    No significant past surgical history      ALLERGIES  No Known Allergies    MEDICATIONS  STANDING MEDICATIONS  amoxicillin 1000 milliGRAM(s) Oral two times a day  benzocaine 20% Spray 1 Spray(s) Topical once  dextrose 50% Injectable 25 Gram(s) IV Push once  heparin   Injectable 5000 Unit(s) SubCutaneous every 8 hours  influenza  Vaccine (HIGH DOSE) 0.7 milliLiter(s) IntraMuscular once  insulin glargine Injectable (LANTUS) 9 Unit(s) SubCutaneous at bedtime  insulin lispro (ADMELOG) corrective regimen sliding scale   SubCutaneous three times a day before meals  insulin lispro (ADMELOG) corrective regimen sliding scale   SubCutaneous at bedtime  insulin lispro Injectable (ADMELOG) 4 Unit(s) SubCutaneous three times a day before meals  melatonin 5 milliGRAM(s) Oral at bedtime  methocarbamol 500 milliGRAM(s) Oral every 8 hours  metoprolol tartrate 25 milliGRAM(s) Oral three times a day  mycophenolate mofetil 500 milliGRAM(s) Oral two times a day  pantoprazole  Injectable 40 milliGRAM(s) IV Push every 12 hours  sodium chloride 0.9%. 1000 milliLiter(s) IV Continuous <Continuous>  sucralfate suspension 1 Gram(s) Oral four times a day  tacrolimus 5 milliGRAM(s) Oral two times a day    PRN MEDICATIONS  acetaminophen     Tablet .. 650 milliGRAM(s) Oral every 6 hours PRN  aluminum hydroxide/magnesium hydroxide/simethicone Suspension 30 milliLiter(s) Oral every 4 hours PRN    VITALS:  T(F): 99  HR: 62  BP: 174/74  RR: 18  SpO2: --    PHYSICAL EXAM  GEN: NAD, Resting comfortably in bed, hard of hearing  PULM: Clear to auscultation bilaterally, No wheezes  CVS: Regular rate and rhythm, S1-S2, no murmurs  ABD: Soft, non-tender, non-distended, no guarding  EXT: No edema  NEURO: A&Ox3, no focal deficits    LABS                        13.8   5.65  )-----------( 174      ( 28 Dec 2021 06:50 )             39.5     12-28    142  |  101  |  13  ----------------------------<  155<H>  3.2<L>   |  26  |  0.9    Ca    9.1      28 Dec 2021 06:50    TPro  5.8<L>  /  Alb  3.6  /  TBili  0.4  /  DBili  x   /  AST  17  /  ALT  14  /  AlkPhos  74  12-28          Troponin T, Serum: <0.01 ng/mL (12-28-21 @ 12:31)  Troponin T, Serum: <0.01 ng/mL (12-28-21 @ 08:06)      CARDIAC MARKERS ( 28 Dec 2021 12:31 )  x     / <0.01 ng/mL / x     / x     / x      CARDIAC MARKERS ( 28 Dec 2021 08:06 )  x     / <0.01 ng/mL / x     / x     / x

## 2021-12-29 NOTE — PROGRESS NOTE ADULT - SUBJECTIVE AND OBJECTIVE BOX
seen and examined   no distress   lying comfortable         PAST HISTORY  --------------------------------------------------------------------------------  No significant changes to PMH, PSH, FHx, SHx, unless otherwise noted    ALLERGIES & MEDICATIONS  --------------------------------------------------------------------------------  Allergies    No Known Allergies    Intolerances      Standing Inpatient Medications  amoxicillin 1000 milliGRAM(s) Oral two times a day  benzocaine 20% Spray 1 Spray(s) Topical once  dextrose 50% Injectable 25 Gram(s) IV Push once  heparin   Injectable 5000 Unit(s) SubCutaneous every 8 hours  influenza  Vaccine (HIGH DOSE) 0.7 milliLiter(s) IntraMuscular once  insulin glargine Injectable (LANTUS) 9 Unit(s) SubCutaneous at bedtime  insulin lispro (ADMELOG) corrective regimen sliding scale   SubCutaneous three times a day before meals  insulin lispro (ADMELOG) corrective regimen sliding scale   SubCutaneous at bedtime  insulin lispro Injectable (ADMELOG) 4 Unit(s) SubCutaneous three times a day before meals  melatonin 5 milliGRAM(s) Oral at bedtime  methocarbamol 500 milliGRAM(s) Oral every 8 hours  metoprolol tartrate 25 milliGRAM(s) Oral three times a day  mycophenolate mofetil 500 milliGRAM(s) Oral two times a day  pantoprazole  Injectable 40 milliGRAM(s) IV Push every 12 hours  sodium chloride 0.9%. 1000 milliLiter(s) IV Continuous <Continuous>  sucralfate suspension 1 Gram(s) Oral four times a day  tacrolimus 5 milliGRAM(s) Oral two times a day    PRN Inpatient Medications  acetaminophen     Tablet .. 650 milliGRAM(s) Oral every 6 hours PRN  aluminum hydroxide/magnesium hydroxide/simethicone Suspension 30 milliLiter(s) Oral every 4 hours PRN      VITALS/PHYSICAL EXAM  --------------------------------------------------------------------------------  T(C): 37.2 (12-28-21 @ 21:32), Max: 37.2 (12-28-21 @ 21:32)  HR: 62 (12-29-21 @ 06:14) (62 - 67)  BP: 174/74 (12-29-21 @ 06:14) (123/60 - 174/74)  RR: 18 (12-28-21 @ 21:32) (18 - 18)  SpO2: --  Wt(kg): --        12-28-21 @ 07:01  -  12-29-21 @ 07:00  --------------------------------------------------------  IN: 350 mL / OUT: 800 mL / NET: -450 mL      Physical Exam:  	Gen: NAD  	Pulm: CTA B/L  	CV: S1S2; no rub  	Abd: +distended  	    LABS/STUDIES  --------------------------------------------------------------------------------              13.7   5.80  >-----------<  189      [12-29-21 @ 04:30]              40.2     142  |  101  |  13  ----------------------------<  155      [12-28-21 @ 06:50]  3.2   |  26  |  0.9        Ca     9.1     [12-28-21 @ 06:50]    TPro  5.8  /  Alb  3.6  /  TBili  0.4  /  DBili  x   /  AST  17  /  ALT  14  /  AlkPhos  74  [12-28-21 @ 06:50]        Troponin <0.01      [12-28-21 @ 12:31]    Creatinine Trend:  SCr 0.9 [12-28 @ 06:50]  SCr 1.0 [12-27 @ 06:56]  SCr 0.9 [12-24 @ 04:30]  SCr 0.9 [12-23 @ 04:30]  SCr 1.1 [12-22 @ 06:30]        Ferritin 140      [12-23-21 @ 04:30]  TSH 2.40      [11-02-21 @ 04:30]  Lipid: chol 144, , HDL 51, LDL --      [11-02-21 @ 04:30]

## 2021-12-29 NOTE — PROGRESS NOTE ADULT - ATTENDING COMMENTS
as above    seen at start of lunch    consult follow ups appreciated    hopeful d/c tomorrow    PPI to convert to PO  H. pylori regimen noted    discussed with resident
Patient with severe esophagitis. Continue IV PPI and Carafate. Advance diet as tolerated. To d/w nephrology regarding role of medication in etiology of esophagitis.
Pt remains nauseous, cannot keep food down.   Cont PPI gtt for now, Carafate.   Liquid diet, will advance as tolerated.    Anticipated DC in 24-48 hrs if tolerates Po diet
patient seen this morning    d/w family at bedside    still intolerant of solids but will assess with diet upgrade    on PPI    hopeful d/c in 24 hours    d/w resident
Hpylori, suggest treat and follow up to confirm eradication. Odynophagia secondary to severe esophagitis (possible etiology medication induced), improved with carafate and PPI, may also add viscous lidocaine oral solution or similar for symptomatic relief.
No signs of ongoing bleeding.   EGD today.  Possible discharge tomorrow.  Monitor Hb, cont PPi IV and Carafate.
seen this morning    discussed with resident    tolerating PO    renal noted-tacrolimus level pending    to attempt d/c today

## 2021-12-30 ENCOUNTER — TRANSCRIPTION ENCOUNTER (OUTPATIENT)
Age: 66
End: 2021-12-30

## 2021-12-30 VITALS — HEART RATE: 65 BPM | SYSTOLIC BLOOD PRESSURE: 149 MMHG | TEMPERATURE: 97 F | DIASTOLIC BLOOD PRESSURE: 72 MMHG

## 2021-12-30 LAB
ALBUMIN SERPL ELPH-MCNC: 3.3 G/DL — LOW (ref 3.5–5.2)
ALP SERPL-CCNC: 73 U/L — SIGNIFICANT CHANGE UP (ref 30–115)
ALT FLD-CCNC: 17 U/L — SIGNIFICANT CHANGE UP (ref 0–41)
ANION GAP SERPL CALC-SCNC: 12 MMOL/L — SIGNIFICANT CHANGE UP (ref 7–14)
AST SERPL-CCNC: 17 U/L — SIGNIFICANT CHANGE UP (ref 0–41)
BASOPHILS # BLD AUTO: 0.04 K/UL — SIGNIFICANT CHANGE UP (ref 0–0.2)
BASOPHILS NFR BLD AUTO: 0.7 % — SIGNIFICANT CHANGE UP (ref 0–1)
BILIRUB SERPL-MCNC: 0.3 MG/DL — SIGNIFICANT CHANGE UP (ref 0.2–1.2)
BUN SERPL-MCNC: 14 MG/DL — SIGNIFICANT CHANGE UP (ref 10–20)
CALCIUM SERPL-MCNC: 8.7 MG/DL — SIGNIFICANT CHANGE UP (ref 8.5–10.1)
CHLORIDE SERPL-SCNC: 106 MMOL/L — SIGNIFICANT CHANGE UP (ref 98–110)
CO2 SERPL-SCNC: 22 MMOL/L — SIGNIFICANT CHANGE UP (ref 17–32)
CREAT SERPL-MCNC: 1 MG/DL — SIGNIFICANT CHANGE UP (ref 0.7–1.5)
EOSINOPHIL # BLD AUTO: 0.27 K/UL — SIGNIFICANT CHANGE UP (ref 0–0.7)
EOSINOPHIL NFR BLD AUTO: 4.7 % — SIGNIFICANT CHANGE UP (ref 0–8)
GLUCOSE BLDC GLUCOMTR-MCNC: 101 MG/DL — HIGH (ref 70–99)
GLUCOSE BLDC GLUCOMTR-MCNC: 236 MG/DL — HIGH (ref 70–99)
GLUCOSE SERPL-MCNC: 95 MG/DL — SIGNIFICANT CHANGE UP (ref 70–99)
HCT VFR BLD CALC: 38.4 % — LOW (ref 42–52)
HGB BLD-MCNC: 12.9 G/DL — LOW (ref 14–18)
IMM GRANULOCYTES NFR BLD AUTO: 0.5 % — HIGH (ref 0.1–0.3)
LYMPHOCYTES # BLD AUTO: 2.13 K/UL — SIGNIFICANT CHANGE UP (ref 1.2–3.4)
LYMPHOCYTES # BLD AUTO: 37 % — SIGNIFICANT CHANGE UP (ref 20.5–51.1)
MAGNESIUM SERPL-MCNC: 1.4 MG/DL — LOW (ref 1.8–2.4)
MCHC RBC-ENTMCNC: 30.8 PG — SIGNIFICANT CHANGE UP (ref 27–31)
MCHC RBC-ENTMCNC: 33.6 G/DL — SIGNIFICANT CHANGE UP (ref 32–37)
MCV RBC AUTO: 91.6 FL — SIGNIFICANT CHANGE UP (ref 80–94)
MONOCYTES # BLD AUTO: 0.74 K/UL — HIGH (ref 0.1–0.6)
MONOCYTES NFR BLD AUTO: 12.9 % — HIGH (ref 1.7–9.3)
MYCOPHENOLATE SERPL-MCNC: 1.6 UG/ML — SIGNIFICANT CHANGE UP (ref 1–3.5)
MYCOPHENOLIC ACID GLUCURONIDE: 30 UG/ML — SIGNIFICANT CHANGE UP (ref 15–125)
NEUTROPHILS # BLD AUTO: 2.54 K/UL — SIGNIFICANT CHANGE UP (ref 1.4–6.5)
NEUTROPHILS NFR BLD AUTO: 44.2 % — SIGNIFICANT CHANGE UP (ref 42.2–75.2)
NRBC # BLD: 0 /100 WBCS — SIGNIFICANT CHANGE UP (ref 0–0)
PLATELET # BLD AUTO: 191 K/UL — SIGNIFICANT CHANGE UP (ref 130–400)
POTASSIUM SERPL-MCNC: 4.3 MMOL/L — SIGNIFICANT CHANGE UP (ref 3.5–5)
POTASSIUM SERPL-SCNC: 4.3 MMOL/L — SIGNIFICANT CHANGE UP (ref 3.5–5)
PROT SERPL-MCNC: 5.4 G/DL — LOW (ref 6–8)
RBC # BLD: 4.19 M/UL — LOW (ref 4.7–6.1)
RBC # FLD: 12.7 % — SIGNIFICANT CHANGE UP (ref 11.5–14.5)
SODIUM SERPL-SCNC: 140 MMOL/L — SIGNIFICANT CHANGE UP (ref 135–146)
TACROLIMUS SERPL-MCNC: 13.3 NG/ML — SIGNIFICANT CHANGE UP
WBC # BLD: 5.75 K/UL — SIGNIFICANT CHANGE UP (ref 4.8–10.8)
WBC # FLD AUTO: 5.75 K/UL — SIGNIFICANT CHANGE UP (ref 4.8–10.8)

## 2021-12-30 PROCEDURE — 99238 HOSP IP/OBS DSCHRG MGMT 30/<: CPT

## 2021-12-30 RX ORDER — TACROLIMUS 5 MG/1
5 CAPSULE ORAL
Refills: 0 | Status: DISCONTINUED | OUTPATIENT
Start: 2021-12-30 | End: 2021-12-30

## 2021-12-30 RX ORDER — TACROLIMUS 5 MG/1
9 CAPSULE ORAL
Qty: 270 | Refills: 0
Start: 2021-12-30 | End: 2022-01-28

## 2021-12-30 RX ORDER — TACROLIMUS 5 MG/1
4.5 CAPSULE ORAL
Refills: 0 | Status: DISCONTINUED | OUTPATIENT
Start: 2021-12-30 | End: 2021-12-30

## 2021-12-30 RX ORDER — TACROLIMUS 5 MG/1
1 CAPSULE ORAL
Qty: 0 | Refills: 0 | DISCHARGE
Start: 2021-12-30

## 2021-12-30 RX ORDER — MAGNESIUM SULFATE 500 MG/ML
2 VIAL (ML) INJECTION
Refills: 0 | Status: COMPLETED | OUTPATIENT
Start: 2021-12-30 | End: 2021-12-30

## 2021-12-30 RX ORDER — TACROLIMUS 5 MG/1
1 CAPSULE ORAL
Qty: 0 | Refills: 0 | DISCHARGE

## 2021-12-30 RX ORDER — CLARITHROMYCIN 500 MG
1 TABLET ORAL
Qty: 28 | Refills: 0
Start: 2021-12-30 | End: 2022-01-12

## 2021-12-30 RX ORDER — SUCRALFATE 1 G
10 TABLET ORAL
Qty: 1200 | Refills: 0
Start: 2021-12-30 | End: 2022-01-28

## 2021-12-30 RX ORDER — PANTOPRAZOLE SODIUM 20 MG/1
1 TABLET, DELAYED RELEASE ORAL
Qty: 60 | Refills: 0
Start: 2021-12-30 | End: 2022-01-28

## 2021-12-30 RX ORDER — AMOXICILLIN 250 MG/5ML
2 SUSPENSION, RECONSTITUTED, ORAL (ML) ORAL
Qty: 56 | Refills: 0
Start: 2021-12-30 | End: 2022-01-12

## 2021-12-30 RX ADMIN — Medication 4 UNIT(S): at 12:14

## 2021-12-30 RX ADMIN — MYCOPHENOLATE MOFETIL 500 MILLIGRAM(S): 250 CAPSULE ORAL at 05:37

## 2021-12-30 RX ADMIN — Medication 1 GRAM(S): at 05:37

## 2021-12-30 RX ADMIN — Medication 25 GRAM(S): at 15:40

## 2021-12-30 RX ADMIN — HEPARIN SODIUM 5000 UNIT(S): 5000 INJECTION INTRAVENOUS; SUBCUTANEOUS at 14:10

## 2021-12-30 RX ADMIN — TACROLIMUS 5 MILLIGRAM(S): 5 CAPSULE ORAL at 05:36

## 2021-12-30 RX ADMIN — Medication 25 GRAM(S): at 12:13

## 2021-12-30 RX ADMIN — PANTOPRAZOLE SODIUM 40 MILLIGRAM(S): 20 TABLET, DELAYED RELEASE ORAL at 05:36

## 2021-12-30 RX ADMIN — Medication 25 MILLIGRAM(S): at 05:37

## 2021-12-30 RX ADMIN — TACROLIMUS 5 MILLIGRAM(S): 5 CAPSULE ORAL at 12:15

## 2021-12-30 RX ADMIN — METHOCARBAMOL 500 MILLIGRAM(S): 500 TABLET, FILM COATED ORAL at 15:40

## 2021-12-30 RX ADMIN — METHOCARBAMOL 500 MILLIGRAM(S): 500 TABLET, FILM COATED ORAL at 05:36

## 2021-12-30 RX ADMIN — HEPARIN SODIUM 5000 UNIT(S): 5000 INJECTION INTRAVENOUS; SUBCUTANEOUS at 05:36

## 2021-12-30 RX ADMIN — Medication 25 MILLIGRAM(S): at 15:40

## 2021-12-30 RX ADMIN — Medication 2: at 12:13

## 2021-12-30 RX ADMIN — Medication 1000 MILLIGRAM(S): at 05:37

## 2021-12-30 RX ADMIN — Medication 1 GRAM(S): at 12:15

## 2021-12-30 NOTE — DISCHARGE NOTE PROVIDER - NSDCHHPTASSISTHOME_GEN_ALL_CORE
Had BM on toilet, unable to discern if she passed a blood clot, she felt like something came out vaginally. Will continue to watch bleeding. Patient Needs Assistance to Leave Residence...

## 2021-12-30 NOTE — PROGRESS NOTE ADULT - REASON FOR ADMISSION
Dark stool

## 2021-12-30 NOTE — DISCHARGE NOTE PROVIDER - CARE PROVIDER_API CALL
CELESTINO CABELLO  Internal Medicine  N  IRINEO TIRADO, Phys,    Phone: [phone]  Fax: [phone])-  Follow Up Time: 1 week   CELESTINO CABELLO  Internal Medicine  N  IRINEO TIRADO, [place],    Phone: ([phone]  Fax: ()-  Follow Up Time: 1 week    [facility]   [address] [place] [address]  Phone: [phone]  Fax: (   )    -  Established Patient  Follow Up Time: 1-3 days    Alaina Colvin)  Gastroenterology; Internal Medicine  [address] [place] [id number]  Phone: [phone]  Fax: [phone]  Follow Up Time: 2 weeks

## 2021-12-30 NOTE — DISCHARGE NOTE PROVIDER - NSDCMRMEDTOKEN_GEN_ALL_CORE_FT
Endocet 10/325 oral tablet: 1 tab(s) orally 2 times a day MDD:2  Lasix 20 mg oral tablet: 1 tab(s) orally once a day  Lopressor 50 mg oral tablet: 25 milligram(s) orally 3 times a day  methocarbamol 500 mg oral tablet: 1 tab(s) orally every 8 hours   mycophenolate mofetil 500 mg oral tablet: 1 tab(s) orally 2 times a day  NIFEdipine 90 mg oral tablet, extended release: 1 tab(s) orally once a day  repaglinide 1 mg oral tablet: 1 tab(s) orally 3 times a day (before meals)  tacrolimus 5 mg oral capsule: 1 cap(s) orally every 12 hours   amoxicillin 500 mg oral capsule: 2 cap(s) orally 2 times a day  clarithromycin 500 mg oral tablet: 1 tab(s) orally 2 times a day   Endocet 10/325 oral tablet: 1 tab(s) orally 2 times a day MDD:2  Lasix 20 mg oral tablet: 1 tab(s) orally once a day  Lopressor 50 mg oral tablet: 25 milligram(s) orally 3 times a day  methocarbamol 500 mg oral tablet: 1 tab(s) orally every 8 hours   mycophenolate mofetil 500 mg oral tablet: 1 tab(s) orally 2 times a day  NIFEdipine 90 mg oral tablet, extended release: 1 tab(s) orally once a day  Protonix 40 mg oral delayed release tablet: 1 tab(s) orally 2 times a day   repaglinide 1 mg oral tablet: 1 tab(s) orally 3 times a day (before meals)  sucralfate 1 g/10 mL oral suspension: 10 milliliter(s) orally 4 times a day  tacrolimus 0.5 mg oral capsule: 9 cap(s) orally once a day (at bedtime)   tacrolimus 5 mg oral capsule: 1 cap(s) orally once a day in AM

## 2021-12-30 NOTE — DISCHARGE NOTE NURSING/CASE MANAGEMENT/SOCIAL WORK - NSDCPEFALRISK_GEN_ALL_CORE
For information on Fall & Injury Prevention, visit: [email or web address] OR  [email or web address] OR  https://www.cdc.gov/steadi/patient.html

## 2021-12-30 NOTE — PROGRESS NOTE ADULT - PROVIDER SPECIALTY LIST ADULT
Hospitalist
Internal Medicine
Gastroenterology
Internal Medicine
Internal Medicine
Nephrology
Nephrology
Gastroenterology
Gastroenterology
Hospitalist
Internal Medicine
Internal Medicine
Gastroenterology
Hospitalist
Internal Medicine
Nephrology

## 2021-12-30 NOTE — PROGRESS NOTE ADULT - SUBJECTIVE AND OBJECTIVE BOX
seen and examined  no distress   lying comfortable         PAST HISTORY  --------------------------------------------------------------------------------  No significant changes to PMH, PSH, FHx, SHx, unless otherwise noted    ALLERGIES & MEDICATIONS  --------------------------------------------------------------------------------  Allergies    No Known Allergies    Intolerances      Standing Inpatient Medications  amoxicillin 1000 milliGRAM(s) Oral two times a day  benzocaine 20% Spray 1 Spray(s) Topical once  dextrose 50% Injectable 25 Gram(s) IV Push once  heparin   Injectable 5000 Unit(s) SubCutaneous every 8 hours  influenza  Vaccine (HIGH DOSE) 0.7 milliLiter(s) IntraMuscular once  insulin glargine Injectable (LANTUS) 9 Unit(s) SubCutaneous at bedtime  insulin lispro (ADMELOG) corrective regimen sliding scale   SubCutaneous three times a day before meals  insulin lispro (ADMELOG) corrective regimen sliding scale   SubCutaneous at bedtime  insulin lispro Injectable (ADMELOG) 4 Unit(s) SubCutaneous three times a day before meals  melatonin 5 milliGRAM(s) Oral at bedtime  methocarbamol 500 milliGRAM(s) Oral every 8 hours  metoprolol tartrate 25 milliGRAM(s) Oral three times a day  mycophenolate mofetil 500 milliGRAM(s) Oral two times a day  pantoprazole  Injectable 40 milliGRAM(s) IV Push every 12 hours  sodium chloride 0.9%. 1000 milliLiter(s) IV Continuous <Continuous>  sucralfate suspension 1 Gram(s) Oral four times a day  tacrolimus 5 milliGRAM(s) Oral two times a day    PRN Inpatient Medications  acetaminophen     Tablet .. 650 milliGRAM(s) Oral every 6 hours PRN  aluminum hydroxide/magnesium hydroxide/simethicone Suspension 30 milliLiter(s) Oral every 4 hours PRN        VITALS/PHYSICAL EXAM  --------------------------------------------------------------------------------  T(C): 36.1 (12-29-21 @ 08:24), Max: 36.1 (12-29-21 @ 08:24)  HR: 66 (12-29-21 @ 08:24) (66 - 66)  BP: 151/70 (12-29-21 @ 08:24) (151/70 - 151/70)  RR: 18 (12-29-21 @ 08:24) (18 - 18)  SpO2: --  Wt(kg): --        12-29-21 @ 07:01  -  12-30-21 @ 07:00  --------------------------------------------------------  IN: 600 mL / OUT: 0 mL / NET: 600 mL      Physical Exam:  	Gen: NAD,  	Pulm: CTA B/L  	CV:S1S2; no rub  	Abd: +distended      LABS/STUDIES  --------------------------------------------------------------------------------              12.9   5.75  >-----------<  191      [12-30-21 @ 06:22]              38.4     140  |  106  |  14  ----------------------------<  95      [12-30-21 @ 06:22]  4.3   |  22  |  1.0        Ca     8.7     [12-30-21 @ 06:22]      Mg     1.4     [12-30-21 @ 06:22]    TPro  5.4  /  Alb  3.3  /  TBili  0.3  /  DBili  x   /  AST  17  /  ALT  17  /  AlkPhos  73  [12-30-21 @ 06:22]        Troponin <0.01      [12-28-21 @ 12:31]    Creatinine Trend:  SCr 1.0 [12-30 @ 06:22]  SCr 1.0 [12-29 @ 04:30]  SCr 0.9 [12-28 @ 06:50]  SCr 1.0 [12-27 @ 06:56]  SCr 0.9 [12-24 @ 04:30]        Ferritin 140      [12-23-21 @ 04:30]  TSH 2.40      [11-02-21 @ 04:30]  Lipid: chol 144, , HDL 51, LDL --      [11-02-21 @ 04:30]

## 2021-12-30 NOTE — DISCHARGE NOTE PROVIDER - NSDCCPCAREPLAN_GEN_ALL_CORE_FT
PRINCIPAL DISCHARGE DIAGNOSIS  Diagnosis: Dark stools  Assessment and Plan of Treatment: You were admitted to the hospital for dark stools and vomiting. You had endoscopy done with gastroenterology who found severe esophagitis which is inflammation of the esophagus. You were started on medication that reduces acid production in your stomach, and will improve the inflammation. Please continue with these medications and follow up as outpatient with Gastroenterology and your Nephrologist      SECONDARY DISCHARGE DIAGNOSES  Diagnosis: KAREEM (acute kidney injury)  Assessment and Plan of Treatment:

## 2021-12-30 NOTE — DISCHARGE NOTE PROVIDER - HOSPITAL COURSE
66 year old (active smoker) Korean Speaking male patient known to have: Baseline: AOx3; lives with wife at home; ambulates independently unassisted; has 14 steps at home, Hypertension. Home meds Lopressor 25mg TID, Nifedipine 90mg QD, Lasix 20mg QD, DM II. No hba1c. Home med Repgaglinide 1mg QD, History of kidney transplant in [place] in 2015. Patient unsure why. He follows with Dr Moreno located at [address] [place] [address]. Home meds Tacrolimus 5mg BID and Mycophenolate Mofetil 500mg BID and Chronic Back Pain. Presented to the ED for two episodes of dark stools yesterday, he also reports having 3 episodes of dark vomitus (large amount). Never had a prior episodes, denies Denies fever, chills, cp, sob, abd pain, hematuria, he is not on any AC, denies NSAID use, EtOH use. On admission: T(F): 97.6, HR: 85, BP: 142/69, RR: 20, SpO2: 96%. BUN 39 (baseline 14), Cr 1.6 ( Baseline 1.0),    # Severe Esophagitis w/ melena  - EGD done 12/22: Grade D esophagitis compatible with nonspecific erosive esophagitis.      Erosions in the stomach compatible with erosive gastritis. (Biopsy).    -Prominent mucosal fold was noted in the duodenum cold forceps biopsy was performed for histology. .    - c/w PPI BID   - c/w Carafate  - advance diet to soft as tolerated  - avoid NSAIDs, smoking cessation    - f/u with GI biopsy results  - Hb stable  - s/p RR (12/28) for  severe chest pain today, troponin negative, EKG with no ischemic changes     # H/o Renal Transplant  - Home meds Tacrolimus 5mg BID and Mycophenolate Mofetil 500mg BID. Not on prednisone  - f/u nephrology   - tacrolimus trough and Mycophenolate levels WNL   - Measure Tacrolimus trough today 30mins before tacrolimus dose (ordered for 4pm)  - Monitor BUN and Cr, Avoid nephrotoxic agents and contrast exposure if possible    # DM II  - A1c 11/2/21 6.9  - Home med Repaglinide 1mg QD  - currently on lantus 9U at bedtime, lispro 4U   - monitor CBG, continue insulin lispro sliding scale     Hypomagnesemia  Hypokalemia  - s/p Oral and IV repletion  - follow up AM BMP & Mg     # Hypertension   - c/w  home meds Lopressor 25mg TID, Nifedipine 90mg QD, Lasix 20mg QD       66 year old (active smoker) Turkish Speaking male patient known to have: Baseline: AOx3; lives with wife at home; ambulates independently unassisted; has 14 steps at home, Hypertension. Home meds Lopressor 25mg TID, Nifedipine 90mg QD, Lasix 20mg QD, DM II. No hba1c. Home med Repgaglinide 1mg QD, History of kidney transplant in [place] in 2015. Patient unsure why. He follows with Dr Moreno located at [address] [place] [address]. Home meds Tacrolimus 5mg BID and Mycophenolate Mofetil 500mg BID and Chronic Back Pain. Presented to the ED for two episodes of dark stools yesterday, he also reports having 3 episodes of dark vomitus (large amount). Never had a prior episodes, denies Denies fever, chills, cp, sob, abd pain, hematuria, he is not on any AC, denies NSAID use, EtOH use. On admission: T(F): 97.6, HR: 85, BP: 142/69, RR: 20, SpO2: 96%. BUN 39 (baseline 14), Cr 1.6 ( Baseline 1.0),    Admitted to medicine for severe esopagitis with melena. EGD done 12/22: Grade D esophagitis compatible with nonspecific erosive esophagitis. Erosions in the stomach compatible with erosive gastritis. (Biopsy).    Prominent mucosal fold was noted in the duodenum cold forceps biopsy was performed for histology. c/w PPI BID  and Carafate  Likely from tacrolimus and mycophenolate  Avoid NSAIDs, smoking cessation . Need to follow up with GI for  biopsy results  Hb stable  s/p RR (12/28) for  severe chest pain today, troponin negative, EKG with no ischemic changes     Currently monitoring with Nephrology regarding tacrolimus levels. Needs close follow up as outpatient for continuous monitoring of levels.     Patient is medically stable and safe to be discharged home. 66 year old (active smoker) [place]inyarw[place] Speaking male patient known to have: Baseline: AOx3; lives with wife at home; ambulates independently unassisted; has 14 steps at home, Hypertension. Home meds Lopressor 25mg TID, Nifedipine 90mg QD, Lasix 20mg QD, DM II. No hba1c. Home med Repgaglinide 1mg QD, History of kidney transplant in [place] in 2015. Patient unsure why. He follows with Dr Moreno located at [address] [place] [address]. Home meds Tacrolimus 5mg BID and Mycophenolate Mofetil 500mg BID and Chronic Back Pain. Presented to the ED for two episodes of dark stools yesterday, he also reports having 3 episodes of dark vomitus (large amount). Never had a prior episodes, denies Denies fever, chills, cp, sob, abd pain, hematuria, he is not on any AC, denies NSAID use, EtOH use. On admission: T(F): 97.6, HR: 85, BP: 142/69, RR: 20, SpO2: 96%. BUN 39 (baseline 14), Cr 1.6 ( Baseline 1.0),    Admitted to medicine for severe esopagitis with melena. EGD done 12/22: Grade D esophagitis compatible with nonspecific erosive esophagitis. Erosions in the stomach compatible with erosive gastritis. (Biopsy).    Prominent mucosal fold was noted in the duodenum cold forceps biopsy was performed for histology. c/w PPI BID  and Carafate  Likely from tacrolimus and mycophenolate  Avoid NSAIDs, smoking cessation . Need to follow up with GI for  biopsy results  Hb stable  s/p RR (12/28) for  severe chest pain today, troponin negative, EKG with no ischemic changes     Currently monitoring with Nephrology regarding tacrolimus levels. Needs close follow up as outpatient for continuous monitoring of levels.     Patient is medically stable and safe to be discharged home.     Attending-seen this morning  Feels 100% better than yesterday  Tolerating food, minimal discomfort  Vitals stable  Agree with stability for discharge

## 2021-12-30 NOTE — PROVIDER CONTACT NOTE (OTHER) - SITUATION
/70  HR 66  MD aware
pt's AM fs was 108, pt refused standing 4 units Lisrpo, stating he was not going to eat

## 2021-12-30 NOTE — PROGRESS NOTE ADULT - ASSESSMENT
# ESRD (d/t DM) s/p DDRT in 2015 at [facility], creat baseline 1  # UGIB / s/p EGD, found esophagitis  # Active smoker  #HTN - BP is low now-HOLD Nifedipine and LASIX, may need NS50 cc/hr if po is not possible (severe pain on swallowing)  - non-oliguric  - creat improved to baseline  - repeated FK level noted elevated 13.3, decrease tacrolimus to 5 in am and 4.5 at night and repeat levels in 48 h   - continue MMF  - replete magnesium   - will follow

## 2021-12-30 NOTE — DISCHARGE NOTE PROVIDER - PROVIDER TOKENS
PROVIDER:[TOKEN:[[id number]],FOLLOWUP:[1 week]] PROVIDER:[TOKEN:[[id number]],FOLLOWUP:[1 week]],FREE:[LAST:[Maranda],FIRST:[Tiffanie],PHONE:[phone]],FAX:[(   )    -],ADDRESS:[[place] [address]],FOLLOWUP:[1-3 days],ESTABLISHEDPATIENT:[T]],PROVIDER:[TOKEN:[[id number]],FOLLOWUP:[2 weeks]]

## 2021-12-30 NOTE — DISCHARGE NOTE NURSING/CASE MANAGEMENT/SOCIAL WORK - PATIENT PORTAL LINK FT
You can access the FollowMyHealth Patient Portal offered by [facility] by registering at the following website: [email or web address]. By joining [employer]’s FollowMyHealth portal, you will also be able to view your health information using other applications (apps) compatible with our system.

## 2022-01-10 DIAGNOSIS — E87.6 HYPOKALEMIA: ICD-10-CM

## 2022-01-10 DIAGNOSIS — E11.9 TYPE 2 DIABETES MELLITUS WITHOUT COMPLICATIONS: ICD-10-CM

## 2022-01-10 DIAGNOSIS — Z94.0 KIDNEY TRANSPLANT STATUS: ICD-10-CM

## 2022-01-10 DIAGNOSIS — B96.81 HELICOBACTER PYLORI [H. PYLORI] AS THE CAUSE OF DISEASES CLASSIFIED ELSEWHERE: ICD-10-CM

## 2022-01-10 DIAGNOSIS — K29.00 ACUTE GASTRITIS WITHOUT BLEEDING: ICD-10-CM

## 2022-01-10 DIAGNOSIS — K20.91 ESOPHAGITIS, UNSPECIFIED WITH BLEEDING: ICD-10-CM

## 2022-01-10 DIAGNOSIS — N17.9 ACUTE KIDNEY FAILURE, UNSPECIFIED: ICD-10-CM

## 2022-01-10 DIAGNOSIS — I10 ESSENTIAL (PRIMARY) HYPERTENSION: ICD-10-CM

## 2022-01-10 DIAGNOSIS — G47.00 INSOMNIA, UNSPECIFIED: ICD-10-CM

## 2022-01-10 DIAGNOSIS — F17.200 NICOTINE DEPENDENCE, UNSPECIFIED, UNCOMPLICATED: ICD-10-CM

## 2022-01-10 DIAGNOSIS — E83.42 HYPOMAGNESEMIA: ICD-10-CM

## 2022-04-01 ENCOUNTER — EMERGENCY (EMERGENCY)
Facility: HOSPITAL | Age: 67
LOS: 0 days | Discharge: HOME | End: 2022-04-01
Attending: STUDENT IN AN ORGANIZED HEALTH CARE EDUCATION/TRAINING PROGRAM | Admitting: STUDENT IN AN ORGANIZED HEALTH CARE EDUCATION/TRAINING PROGRAM
Payer: COMMERCIAL

## 2022-04-01 VITALS
SYSTOLIC BLOOD PRESSURE: 153 MMHG | OXYGEN SATURATION: 100 % | RESPIRATION RATE: 20 BRPM | TEMPERATURE: 97 F | HEART RATE: 80 BPM | DIASTOLIC BLOOD PRESSURE: 71 MMHG | HEIGHT: 67 IN

## 2022-04-01 VITALS
HEART RATE: 70 BPM | TEMPERATURE: 98 F | DIASTOLIC BLOOD PRESSURE: 70 MMHG | RESPIRATION RATE: 18 BRPM | SYSTOLIC BLOOD PRESSURE: 153 MMHG | OXYGEN SATURATION: 100 %

## 2022-04-01 DIAGNOSIS — I10 ESSENTIAL (PRIMARY) HYPERTENSION: ICD-10-CM

## 2022-04-01 DIAGNOSIS — E11.621 TYPE 2 DIABETES MELLITUS WITH FOOT ULCER: ICD-10-CM

## 2022-04-01 DIAGNOSIS — Z94.0 KIDNEY TRANSPLANT STATUS: ICD-10-CM

## 2022-04-01 DIAGNOSIS — F17.210 NICOTINE DEPENDENCE, CIGARETTES, UNCOMPLICATED: ICD-10-CM

## 2022-04-01 DIAGNOSIS — L97.419 NON-PRESSURE CHRONIC ULCER OF RIGHT HEEL AND MIDFOOT WITH UNSPECIFIED SEVERITY: ICD-10-CM

## 2022-04-01 PROBLEM — E11.9 TYPE 2 DIABETES MELLITUS WITHOUT COMPLICATIONS: Chronic | Status: ACTIVE | Noted: 2021-12-22

## 2022-04-01 LAB
ALBUMIN SERPL ELPH-MCNC: 4.5 G/DL — SIGNIFICANT CHANGE UP (ref 3.5–5.2)
ALP SERPL-CCNC: 134 U/L — HIGH (ref 30–115)
ALT FLD-CCNC: 9 U/L — SIGNIFICANT CHANGE UP (ref 0–41)
ANION GAP SERPL CALC-SCNC: 13 MMOL/L — SIGNIFICANT CHANGE UP (ref 7–14)
AST SERPL-CCNC: 18 U/L — SIGNIFICANT CHANGE UP (ref 0–41)
BASOPHILS # BLD AUTO: 0.04 K/UL — SIGNIFICANT CHANGE UP (ref 0–0.2)
BASOPHILS NFR BLD AUTO: 0.6 % — SIGNIFICANT CHANGE UP (ref 0–1)
BILIRUB SERPL-MCNC: 0.3 MG/DL — SIGNIFICANT CHANGE UP (ref 0.2–1.2)
BUN SERPL-MCNC: 18 MG/DL — SIGNIFICANT CHANGE UP (ref 10–20)
CALCIUM SERPL-MCNC: 9.5 MG/DL — SIGNIFICANT CHANGE UP (ref 8.5–10.1)
CHLORIDE SERPL-SCNC: 100 MMOL/L — SIGNIFICANT CHANGE UP (ref 98–110)
CO2 SERPL-SCNC: 24 MMOL/L — SIGNIFICANT CHANGE UP (ref 17–32)
CREAT SERPL-MCNC: 1.2 MG/DL — SIGNIFICANT CHANGE UP (ref 0.7–1.5)
EGFR: 65 ML/MIN/1.73M2 — SIGNIFICANT CHANGE UP
EOSINOPHIL # BLD AUTO: 0.22 K/UL — SIGNIFICANT CHANGE UP (ref 0–0.7)
EOSINOPHIL NFR BLD AUTO: 3.4 % — SIGNIFICANT CHANGE UP (ref 0–8)
GLUCOSE SERPL-MCNC: 375 MG/DL — HIGH (ref 70–99)
HCT VFR BLD CALC: 42.4 % — SIGNIFICANT CHANGE UP (ref 42–52)
HGB BLD-MCNC: 14.3 G/DL — SIGNIFICANT CHANGE UP (ref 14–18)
IMM GRANULOCYTES NFR BLD AUTO: 0.2 % — SIGNIFICANT CHANGE UP (ref 0.1–0.3)
LACTATE SERPL-SCNC: 1.9 MMOL/L — SIGNIFICANT CHANGE UP (ref 0.7–2)
LYMPHOCYTES # BLD AUTO: 1.54 K/UL — SIGNIFICANT CHANGE UP (ref 1.2–3.4)
LYMPHOCYTES # BLD AUTO: 23.8 % — SIGNIFICANT CHANGE UP (ref 20.5–51.1)
MCHC RBC-ENTMCNC: 30.7 PG — SIGNIFICANT CHANGE UP (ref 27–31)
MCHC RBC-ENTMCNC: 33.7 G/DL — SIGNIFICANT CHANGE UP (ref 32–37)
MCV RBC AUTO: 91 FL — SIGNIFICANT CHANGE UP (ref 80–94)
MONOCYTES # BLD AUTO: 0.6 K/UL — SIGNIFICANT CHANGE UP (ref 0.1–0.6)
MONOCYTES NFR BLD AUTO: 9.3 % — SIGNIFICANT CHANGE UP (ref 1.7–9.3)
NEUTROPHILS # BLD AUTO: 4.06 K/UL — SIGNIFICANT CHANGE UP (ref 1.4–6.5)
NEUTROPHILS NFR BLD AUTO: 62.7 % — SIGNIFICANT CHANGE UP (ref 42.2–75.2)
NRBC # BLD: 0 /100 WBCS — SIGNIFICANT CHANGE UP (ref 0–0)
PLATELET # BLD AUTO: 193 K/UL — SIGNIFICANT CHANGE UP (ref 130–400)
POTASSIUM SERPL-MCNC: 5.1 MMOL/L — HIGH (ref 3.5–5)
POTASSIUM SERPL-SCNC: 5.1 MMOL/L — HIGH (ref 3.5–5)
PROT SERPL-MCNC: 7.6 G/DL — SIGNIFICANT CHANGE UP (ref 6–8)
RBC # BLD: 4.66 M/UL — LOW (ref 4.7–6.1)
RBC # FLD: 12.6 % — SIGNIFICANT CHANGE UP (ref 11.5–14.5)
SODIUM SERPL-SCNC: 137 MMOL/L — SIGNIFICANT CHANGE UP (ref 135–146)
WBC # BLD: 6.47 K/UL — SIGNIFICANT CHANGE UP (ref 4.8–10.8)
WBC # FLD AUTO: 6.47 K/UL — SIGNIFICANT CHANGE UP (ref 4.8–10.8)

## 2022-04-01 PROCEDURE — 73630 X-RAY EXAM OF FOOT: CPT | Mod: 26,LT

## 2022-04-01 PROCEDURE — 99284 EMERGENCY DEPT VISIT MOD MDM: CPT

## 2022-04-01 NOTE — ED PROVIDER NOTE - PROGRESS NOTE DETAILS
FF: pt is ambulatory with his cane and left hard sole shoe. pt reports he feels comfortable to go home where his bed and kitchen are easily accessible and he has two walkers. FF: pt is ambulatory with his cane and left hard sole shoe. pt reports he feels comfortable to go home where his bed and kitchen are easily accessible and he has two walkers. see consult note from podiatry.

## 2022-04-01 NOTE — ED PROVIDER NOTE - PHYSICAL EXAMINATION
Physical Exam    Vital Signs: I have reviewed the initial vital signs.  Constitutional: well-nourished, appears stated age, no acute distress  Eyes: Conjunctiva pink, Sclera clear   Cardiovascular: S1 and S2, regular rate, regular rhythm, well-perfused extremities, pedals pulses equal and intact b/l.   Respiratory: unlabored respiratory effort, clear to auscultation bilaterally no wheezing, rales and rhonchi. pt is speaking full sentences. no accessory muscle use.   Gastrointestinal: soft, non-tender, nondistended abdomen, no pulsatile mass, normal bowl sounds, no rebound, no guarding, negative psoas, negative obturator, negative murphys.   Musculoskeletal: supple neck, no lower extremity edema, no calf tenderness  Integumentary: warm, dry, no rash.  (+) small left heel ulcer that is fibrotic without warmth, fluctuance, or drainage.    Neurologic: awake, alert, cranial nerves II-XII grossly intact, extremities’ motor and sensory functions grossly intact. steady gait with cane.   Psychiatric: appropriate mood, appropriate affect Physical Exam    Vital Signs: I have reviewed the initial vital signs.  Constitutional: well-nourished, appears stated age, no acute distress  Eyes: Conjunctiva pink, Sclera clear   Cardiovascular: S1 and S2, regular rate, regular rhythm, well-perfused extremities, pedals pulses equal and intact b/l.   Respiratory: unlabored respiratory effort, clear to auscultation bilaterally no wheezing, rales and rhonchi. pt is speaking full sentences. no accessory muscle use.   Gastrointestinal: soft, non-tender, nondistended abdomen, no pulsatile mass, normal bowl sounds, no rebound, no guarding  Musculoskeletal: supple neck, no lower extremity edema, no calf tenderness  Integumentary: warm, dry, no rash.  (+) small left heel ulcer that is thickened without warmth, fluctuance, or drainage.    Neurologic: awake, alert, cranial nerves II-XII grossly intact, extremities’ motor and sensory functions grossly intact. steady gait with cane.   Psychiatric: appropriate mood, appropriate affect

## 2022-04-01 NOTE — CONSULT NOTE ADULT - SUBJECTIVE AND OBJECTIVE BOX
Podiatry Consult Note    Subjective:  FELICIANO JEFFERY is a 67y old  Male who presents with a chief complaint of L heel ulcer / pain;  HPI:  Seen by ED bedside; pt noted pain 5 days ago without trauma; visiting nurse came in yesterday, saw L heel wound and recommended to come into ED for eval; eval L foot;     PAST MEDICAL & SURGICAL HISTORY:  Kidney transplanted  Hypertension  DMII (diabetes mellitus, type 2)    No significant past surgical history    Objective:  Vital Signs Last 24 Hrs  T(C): 36.1 (01 Apr 2022 15:49), Max: 36.1 (01 Apr 2022 15:49)  T(F): 97 (01 Apr 2022 15:49), Max: 97 (01 Apr 2022 15:49)  HR: 80 (01 Apr 2022 15:49) (80 - 80)  BP: 153/71 (01 Apr 2022 15:49) (153/71 - 153/71)  BP(mean): --  RR: 20 (01 Apr 2022 15:49) (20 - 20)  SpO2: 100% (01 Apr 2022 15:49) (100% - 100%)                        14.3   6.47  )-----------( 193      ( 01 Apr 2022 16:00 )             42.4                Physical Exam - Lower Extremity Focused:   Derm:   L heel ulcer; fibrotic wound base ulcer, about 0.5cm x 0.5cm in size; probing to subcutaneous soft tissue level; no active bleeding, no drainage noted;   Vascular: DP and PT Pulses Diminished;   Neuro: Protective Sensation Diminished;  MSK: Pain on L heel on palpation;     Assessment:  L heel ulcer;    Plan:  Chart reviewed and Patient evaluated. All Questions and Concerns Addressed and Answered  Discussed diagnosis and treatment with patient  Wound Flushed w/ normal saline; Betadine / DSD / Kerlix / Light pressure ACE; q24  Local Wound Care; As Stated Above   Pending FXR-L; If FXR-L is negative, stable from podiatry standpoint; follow up as an outpt to Dr. Patrick at [address] [place] [address] [facility] a week post discharge for L foot eval;   If FXR-L shows osteomyelitis; please admit pt to medicine on floor to be treated for IV abx vs surgical intervention, will decide on in-house floor;   Continue local wound care; q24  Weight bearing status; WBAT BL feet;   Discussed Plan w/ Dr. Patrick;     Podiatry

## 2022-04-01 NOTE — ED PROVIDER NOTE - OBJECTIVE STATEMENT
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 pt reports he was seen by a doctor this week who wrapped his foot and place pt in hard sole shoe. pt reports a visiting nurse today who advised pt visit the ED. pt reports pain is worse with weight bearing but has been ambulating with a cane. pt denies fever, chills, weakness, numbness, tingling, leg pain, leg swelling, drainage from the left foot, or recent trauma.

## 2022-04-01 NOTE — ED PROVIDER NOTE - NSFOLLOWUPINSTRUCTIONS_ED_ALL_ED_FT
Venous Ulcer  Image   A venous ulcer is a shallow sore on your lower leg that is caused by poor circulation in your veins. This condition used to be called stasis ulcer.    Veins have valves that help return blood to the heart. If these valves do not work properly, it can cause blood to flow backward and to back up into the veins near the skin. When that happens, blood can pool in your lower legs. The blood can then leak out of your veins, which can irritate your skin. This may cause a break in your skin that becomes a venous ulcer.    Venous ulcer is the most common type of lower leg ulcer. You may have venous ulcers on one leg or on both legs. The area where this condition most commonly develops is around the ankles. A venous ulcer may last for a long time (chronic ulcer) or it may return repeatedly (recurrent ulcer).    What are the causes?  Any condition that causes poor circulation to your legs can lead to a venous ulcer.    What increases the risk?  This condition is more likely to develop in:  People who are 65 years of age or older.  People who are overweight.  People who are not active.  People who have had a leg ulcer in the past.  People who have clots in their lower leg veins (deep vein thrombosis).  People who have inflammation of their leg veins (phlebitis).  Women who have given birth.  People who smoke.  What are the signs or symptoms?  The most common symptom of this condition is an open sore near your ankle. Other symptoms may include:  Swelling.  Thickening of the skin.  Fluid leaking from the ulcer.  Bleeding.  Itching.  Pain and swelling that gets worse when you stand up and feels better when you raise your leg.  Blotchy skin.  Darkening of the skin.  How is this diagnosed?  Your health care provider may suspect a venous ulcer based on your medical history and your risk factors. Your health care provider will check the skin on your legs. Other tests may be done to learn more about the ulcer and to determine the best way to treat it. Tests that may be done include:  Measuring the blood pressure in your arms and legs.  Using sound waves (ultrasound) to measure the blood flow in your leg veins.  How is this treated?  You may need to try several different types of treatment to get your venous ulcer to heal. Healing may take a long time. Treatment may include:  Keeping your leg raised (elevated).  Wearing a type of bandage or stocking to compress the veins of your leg (compression therapy). Venous wounds are not likely to heal or to stay healed without compression.  Taking medicines to improve blood flow.  Taking antibiotic medicines to treat infection.  Cleaning your ulcer and removing any dead tissue from the wound (debridement).  Placing various types of medicated bandage (dressings) or medicated wraps on your ulcer. This helps the ulcer to heal and helps to prevent infection.  Surgery is sometimes needed to close the wound using a piece of skin taken from another area of your body (graft). You may need surgery if other treatments are not working or if your ulcer is very deep.    Follow these instructions at home:  Wound care     Follow instructions from your health care provider about:  How to take care of your wound.  When and how you should change your bandage (dressing).  When you should remove your dressing. If your dressing is dry and sticks to your leg when you try to remove it, moisten or wet the dressing with saline solution or water so that the dressing can be removed without harming your skin or wound tissue.  Check your wound every day for signs of infection. Have a caregiver do this for you if you are not able to do it yourself. Check for:  More redness, swelling, or pain.  More fluid or blood.  Pus, warmth, or a bad smell.  Medicines     Take over-the-counter and prescription medicines only as told by your health care provider.  If you were prescribed an antibiotic medicine, take it or apply it as told by your health care provider. Do not stop taking or using the antibiotic even if your condition improves.  Activity     Do not stand or sit in one position for a long period of time. Rest with your legs raised during the day. If possible, keep your legs above your heart for 30 minutes, 3–4 times a day, or as told by your health care provider.  Do not sit with your legs crossed.  Walk often to increase the blood flow in your legs. Ask your health care provider what level of activity is safe for you.  If you are taking a long ride in a car or plane, take a break to walk around at least once every two hours, or as often as your health care provider recommends. Ask your health care provider if you should take aspirin before long trips.  General instructions     Image   Wear elastic stockings, compression stockings, or support hose as told by your health care provider. This is very important.  Raise the foot of your bed as told by your health care provider.  Do not smoke.  Keep all follow-up visits as told by your health care provider. This is important.  Contact a health care provider if:  You have a fever.  Your ulcer is getting larger or is not healing.  Your pain gets worse.  You have more redness or swelling around your ulcer.  You have more fluid, blood, or pus coming from your ulcer after it has been cleaned by you or your health care provider.  You have warmth or a bad smell coming from your ulcer.

## 2022-04-01 NOTE — ED PROVIDER NOTE - ATTENDING CONTRIBUTION TO CARE
67-year-old male past medical history stated in services of foot pain.  Patient seen by a home visiting nurse and noted to have a small ulcer on his left heel.  Patient states it has been there for a few weeks however became worse over the past 4 days.  No fever/chills, no drainage, no surrounding erythema, no difficulty ambulating, no chest pain, no shortness of breath, no leg swelling, no foot swelling, no palpitations, no dizziness, no nausea/vomiting/diarrhea.    CONSTITUTIONAL: Well-developed; well-nourished; in no acute distress. Sitting up and providing appropriate history and physical examination  SKIN: + Small ulcerated area over left posterior heel.  No drainage or surrounding erythema.  Otherwise skin exam is warm and dry, no acute rash.  HEAD: Normocephalic; atraumatic.  EYES: PERRL, 3 mm bilateral, no nystagmus, EOM intact; conjunctiva and sclera clear.  ENT: No nasal discharge; airway clear.  NECK: Supple; non tender. + full passive ROM in all directions. No JVD  CARD: S1, S2 normal; no murmurs, gallops, or rubs. Regular rate and rhythm. + Symmetric Strong Pulses  RESP: No wheezes, rales or rhonchi. Good air movement bilaterally  ABD: soft; non-distended; non-tender. No Rebound, No Guarding, No signs of peritonitis, No CVA tenderness. No pulsatile abdominal mass. + Strong and Symmetric Pulses  EXT: Normal ROM. No clubbing, cyanosis or edema. Dp and Pt Pulses intact. Cap refill less than 3 seconds  NEURO: CN 2-12 intact, normal finger to nose, normal romberg, stable gait, no sensory or motor deficits, Alert, oriented, grossly unremarkable. No Focal deficits. GCS 15. NIH 0  PSYCH: Cooperative, appropriate.

## 2022-04-01 NOTE — ED ADULT NURSE REASSESSMENT NOTE - NS ED NURSE REASSESS COMMENT FT1
patient received from previous RN.  Patient waiting for dispo.  Patient in stable condition and nad.  Will continue to monitor.

## 2022-04-01 NOTE — ED PROVIDER NOTE - NS ED ATTENDING STATEMENT MOD
This was a shared visit with the GEO. I reviewed and verified the documentation and independently performed the documented:

## 2022-04-01 NOTE — ED PROVIDER NOTE - CARE PROVIDER_API CALL
Duong Patrick (DPM)  Foot Surgery; Podiatric Medicine  [address] [place] [address], [address] [place] [address]  Phone: [phone]  Fax: [phone]  Follow Up Time: 7-10 Days

## 2022-04-01 NOTE — ED PROVIDER NOTE - NS ED ROS FT
CONST: No fever, chills or bodyaches  EYES: No pain, redness, drainage or visual changes.  ENT: No ear pain or discharge, nasal discharge or congestion. No sore throat  CARD: No chest pain, palpitations  RESP: No SOB, cough, hemoptysis. No hx of asthma or COPD  GI: No abdominal pain, N/V/D  : No urinary symptoms  MS: No joint pain, back pain or extremity pain/injury  SKIN: No rashes. (+) left heel ulceration.   NEURO: No headache, dizziness, paresthesias or LOC

## 2022-04-01 NOTE — ED PROVIDER NOTE - CLINICAL SUMMARY MEDICAL DECISION MAKING FREE TEXT BOX
I personally evaluated the patient. I reviewed the Resident´s or Physician Assistant´s note (as assigned above), and agree with the findings and plan except as documented in my note.  Patient evaluated for left heel ulcer.  Labs, x-ray performed in the ED.  Podiatry consulted, recommending follow-up with podiatry clinic next week.  Patient ambulatory in the ED with steady gait feels comfortable returning home.  Instructed to follow-up with podiatry.  I have fully discussed the medical management and delivery of care with the patient. I have discussed any available labs, imaging and treatment options with the patient. Patient confirms understanding and has been given detailed return precautions. Patient instructed to return to the ED should symptoms persist or worsen. Patient has demonstrated capacity and has verbalized understanding. Patient is well appearing upon discharge.

## 2022-04-01 NOTE — ED PROVIDER NOTE - PATIENT PORTAL LINK FT
You can access the FollowMyHealth Patient Portal offered by [facility] by registering at the following website: [email or web address]. By joining [facility]’s FollowMyHealth portal, you will also be able to view your health information using other applications (apps) compatible with our system.

## 2022-05-20 ENCOUNTER — EMERGENCY (EMERGENCY)
Facility: HOSPITAL | Age: 67
LOS: 0 days | Discharge: HOME | End: 2022-05-21
Attending: EMERGENCY MEDICINE | Admitting: EMERGENCY MEDICINE
Payer: COMMERCIAL

## 2022-05-20 VITALS
OXYGEN SATURATION: 98 % | TEMPERATURE: 98 F | HEIGHT: 67 IN | SYSTOLIC BLOOD PRESSURE: 168 MMHG | DIASTOLIC BLOOD PRESSURE: 77 MMHG | HEART RATE: 75 BPM | WEIGHT: 134.92 LBS | RESPIRATION RATE: 16 BRPM

## 2022-05-20 DIAGNOSIS — L03.90 CELLULITIS, UNSPECIFIED: ICD-10-CM

## 2022-05-20 DIAGNOSIS — L97.429 NON-PRESSURE CHRONIC ULCER OF LEFT HEEL AND MIDFOOT WITH UNSPECIFIED SEVERITY: ICD-10-CM

## 2022-05-20 DIAGNOSIS — E11.9 TYPE 2 DIABETES MELLITUS WITHOUT COMPLICATIONS: ICD-10-CM

## 2022-05-20 DIAGNOSIS — Z94.0 KIDNEY TRANSPLANT STATUS: ICD-10-CM

## 2022-05-20 DIAGNOSIS — I10 ESSENTIAL (PRIMARY) HYPERTENSION: ICD-10-CM

## 2022-05-20 DIAGNOSIS — Z87.891 PERSONAL HISTORY OF NICOTINE DEPENDENCE: ICD-10-CM

## 2022-05-20 LAB
ALBUMIN SERPL ELPH-MCNC: 4.5 G/DL — SIGNIFICANT CHANGE UP (ref 3.5–5.2)
ALP SERPL-CCNC: 101 U/L — SIGNIFICANT CHANGE UP (ref 30–115)
ALT FLD-CCNC: 8 U/L — SIGNIFICANT CHANGE UP (ref 0–41)
ANION GAP SERPL CALC-SCNC: 12 MMOL/L — SIGNIFICANT CHANGE UP (ref 7–14)
AST SERPL-CCNC: 23 U/L — SIGNIFICANT CHANGE UP (ref 0–41)
BASOPHILS # BLD AUTO: 0.03 K/UL — SIGNIFICANT CHANGE UP (ref 0–0.2)
BASOPHILS NFR BLD AUTO: 0.5 % — SIGNIFICANT CHANGE UP (ref 0–1)
BILIRUB SERPL-MCNC: 0.5 MG/DL — SIGNIFICANT CHANGE UP (ref 0.2–1.2)
BUN SERPL-MCNC: 14 MG/DL — SIGNIFICANT CHANGE UP (ref 10–20)
CALCIUM SERPL-MCNC: 9.5 MG/DL — SIGNIFICANT CHANGE UP (ref 8.5–10.1)
CHLORIDE SERPL-SCNC: 100 MMOL/L — SIGNIFICANT CHANGE UP (ref 98–110)
CO2 SERPL-SCNC: 25 MMOL/L — SIGNIFICANT CHANGE UP (ref 17–32)
CREAT SERPL-MCNC: 1 MG/DL — SIGNIFICANT CHANGE UP (ref 0.7–1.5)
EGFR: 81 ML/MIN/1.73M2 — SIGNIFICANT CHANGE UP
EOSINOPHIL # BLD AUTO: 0.25 K/UL — SIGNIFICANT CHANGE UP (ref 0–0.7)
EOSINOPHIL NFR BLD AUTO: 4.6 % — SIGNIFICANT CHANGE UP (ref 0–8)
GLUCOSE SERPL-MCNC: 297 MG/DL — HIGH (ref 70–99)
HCT VFR BLD CALC: 41.5 % — LOW (ref 42–52)
HGB BLD-MCNC: 14.7 G/DL — SIGNIFICANT CHANGE UP (ref 14–18)
IMM GRANULOCYTES NFR BLD AUTO: 0.2 % — SIGNIFICANT CHANGE UP (ref 0.1–0.3)
LYMPHOCYTES # BLD AUTO: 1.78 K/UL — SIGNIFICANT CHANGE UP (ref 1.2–3.4)
LYMPHOCYTES # BLD AUTO: 32.4 % — SIGNIFICANT CHANGE UP (ref 20.5–51.1)
MCHC RBC-ENTMCNC: 30.7 PG — SIGNIFICANT CHANGE UP (ref 27–31)
MCHC RBC-ENTMCNC: 35.4 G/DL — SIGNIFICANT CHANGE UP (ref 32–37)
MCV RBC AUTO: 86.6 FL — SIGNIFICANT CHANGE UP (ref 80–94)
MONOCYTES # BLD AUTO: 0.47 K/UL — SIGNIFICANT CHANGE UP (ref 0.1–0.6)
MONOCYTES NFR BLD AUTO: 8.6 % — SIGNIFICANT CHANGE UP (ref 1.7–9.3)
NEUTROPHILS # BLD AUTO: 2.95 K/UL — SIGNIFICANT CHANGE UP (ref 1.4–6.5)
NEUTROPHILS NFR BLD AUTO: 53.7 % — SIGNIFICANT CHANGE UP (ref 42.2–75.2)
NRBC # BLD: 0 /100 WBCS — SIGNIFICANT CHANGE UP (ref 0–0)
PLATELET # BLD AUTO: 180 K/UL — SIGNIFICANT CHANGE UP (ref 130–400)
POTASSIUM SERPL-MCNC: 5.1 MMOL/L — HIGH (ref 3.5–5)
POTASSIUM SERPL-SCNC: 5.1 MMOL/L — HIGH (ref 3.5–5)
PROT SERPL-MCNC: 7.5 G/DL — SIGNIFICANT CHANGE UP (ref 6–8)
RBC # BLD: 4.79 M/UL — SIGNIFICANT CHANGE UP (ref 4.7–6.1)
RBC # FLD: 12.8 % — SIGNIFICANT CHANGE UP (ref 11.5–14.5)
SODIUM SERPL-SCNC: 137 MMOL/L — SIGNIFICANT CHANGE UP (ref 135–146)
WBC # BLD: 5.49 K/UL — SIGNIFICANT CHANGE UP (ref 4.8–10.8)
WBC # FLD AUTO: 5.49 K/UL — SIGNIFICANT CHANGE UP (ref 4.8–10.8)

## 2022-05-20 PROCEDURE — 99284 EMERGENCY DEPT VISIT MOD MDM: CPT

## 2022-05-20 PROCEDURE — 73630 X-RAY EXAM OF FOOT: CPT | Mod: 26,LT

## 2022-05-20 RX ORDER — CEPHALEXIN 500 MG
500 CAPSULE ORAL ONCE
Refills: 0 | Status: COMPLETED | OUTPATIENT
Start: 2022-05-20 | End: 2022-05-20

## 2022-05-20 RX ORDER — CEPHALEXIN 500 MG
1 CAPSULE ORAL
Qty: 40 | Refills: 0
Start: 2022-05-20 | End: 2022-05-29

## 2022-05-20 RX ORDER — AZTREONAM 2 G
1 VIAL (EA) INJECTION
Qty: 14 | Refills: 0
Start: 2022-05-20 | End: 2022-05-26

## 2022-05-20 RX ADMIN — Medication 500 MILLIGRAM(S): at 22:43

## 2022-05-20 RX ADMIN — Medication 1 TABLET(S): at 22:43

## 2022-05-20 NOTE — ED PROVIDER NOTE - NS ED ROS FT
Constitutional: (-) fever  Eyes/ENT: (-) blurry vision, (-) epistaxis  Cardiovascular: (-) chest pain, (-) syncope  Respiratory: (-) cough, (-) shortness of breath  Gastrointestinal: (-) vomiting, (-) diarrhea  : (-) dysuria, (-) hematuria  Musculoskeletal: (-) neck pain, (-) back pain, (-) joint pain  Integumentary: (+) L heel ulcer  Neurological: (-) headache, (-) altered mental status  Allergic/Immunologic: (-) pruritus

## 2022-05-20 NOTE — ED PROVIDER NOTE - ATTENDING APP SHARED VISIT CONTRIBUTION OF CARE
67-year-old male past medical history of diabetes, hypertension, kidney transplant presented to ED for concern for infected L heel ulcer.  Patient states he has a visiting nurse that comes to his house every 3 days and she came today and was concerned it was infected and told him to come to the emergency department.  Patient states he has noticed any foul smell or discharge but he has noticed pain with ambulation. No fevers or chills. Pt is not on any antibiotics     Const: NAD  Eyes: PERRL, no conjunctival injection  HENT:  Neck supple without meningismus   CV: RRR, Warm, well-perfused extremities  RESP: CTA B/L, no tachypnea   GI: soft, non-tender, non-distended  MSK: erythema and tenderness to palpation of L heel. No ulcer or crepitus.     concern for cellulitis

## 2022-05-20 NOTE — ED ADULT NURSE NOTE - OBJECTIVE STATEMENT
pt presents to the ed for left foot ulcer  hx of dm2  pt is axox4, ambulates with cane  denies fever chills  denies n/v/

## 2022-05-20 NOTE — ED PROVIDER NOTE - PHYSICAL EXAMINATION
CONST: Well appearing in NAD  EYES: Sclera and conjunctiva clear.   ENT: No nasal discharge. Oropharynx normal appearing  NECK: Non-tender, no meningeal signs. normal ROM. supple   CARD: S1 S2; No jvd  RESP: Equal BS B/L, No wheezes, rhonchi or rales. No distress  GI: Soft, non-tender, non-distended. no cva tenderness. normal BS  MS: Normal ROM in all extremities. pulses 2 +. no calf tenderness or swelling  SKIN: Small ulcerative wound to L heel, no dc or induration  NEURO: A&Ox3, No focal deficits. Strength 5/5 with no sensory deficits.

## 2022-05-20 NOTE — ED ADULT TRIAGE NOTE - CHIEF COMPLAINT QUOTE
pt BIBA for diabetic foot ulcer. pt has visiting nurse and was sent in for worsening ulcer on L foot

## 2022-05-20 NOTE — ED PROVIDER NOTE - NSICDXPASTMEDICALHX_GEN_ALL_CORE_FT
PAST MEDICAL HISTORY:  DMII (diabetes mellitus, type 2)     Hypertension     Kidney transplanted

## 2022-05-20 NOTE — ED PROVIDER NOTE - OBJECTIVE STATEMENT
67y M pmh DM, HTN, Kidney Transplant presents for eval of L foot wound. Pt has small ulcerative wound to L heel that is monitored by wound care nurses. Today the wound care nurse noticed some redness around the ulcer prompting ED visit. Pt endorses mild pain localized to ulcer, no aggravating or relieving factors.

## 2022-05-20 NOTE — ED PROVIDER NOTE - CLINICAL SUMMARY MEDICAL DECISION MAKING FREE TEXT BOX
67-year-old male presented to ED for pain and erythema to left heel concerning for cellulitis.  Patient had labs which were within normal limits and x-rays did not demonstrate any air or fracture.  Patient given dose of Keflex and Bactrim in ED and will start patient on antibiotics as an outpatient.  Discussed return precautions which include worsening erythema pain skin changes fever or chills.  Patient understands DC home.

## 2022-05-20 NOTE — ED ADULT NURSE NOTE - CAS ELECT INFOMATION PROVIDED
medicaid cab, dc given pt is axox4, ambulatory with cane, has a key to his appt, medicaid cab called by  upfront/DC instructions

## 2022-05-21 RX ORDER — AZTREONAM 2 G
1 VIAL (EA) INJECTION
Qty: 14 | Refills: 0
Start: 2022-05-21 | End: 2022-05-27

## 2022-05-21 RX ORDER — CEPHALEXIN 500 MG
1 CAPSULE ORAL
Qty: 40 | Refills: 0
Start: 2022-05-21 | End: 2022-05-30

## 2022-05-21 NOTE — ED ADULT NURSE REASSESSMENT NOTE - NS ED NURSE REASSESS COMMENT FT1
Patient is A&Ox4, fully functional with no complaints of pain. No IV present. Left Heel Ulcer wrapped. Patient is awaiting ambulette to go home.
placed patient in transport. Patient is awaiting transport

## 2022-07-11 NOTE — ED PROVIDER NOTE - ADMIT DISPOSITION PRESENT ON ADMISSION SEPSIS
Detail Level: Detailed Quality 226: Preventive Care And Screening: Tobacco Use: Screening And Cessation Intervention: Patient screened for tobacco use and is an ex/non-smoker Detail Level: Detailed Quality 110: Preventive Care And Screening: Influenza Immunization: Influenza Immunization not Administered because Patient Refused. No

## 2022-08-03 NOTE — ED ADULT TRIAGE NOTE - SPO2 (%)
98 Azithromycin Pregnancy And Lactation Text: This medication is considered safe during pregnancy and is also secreted in breast milk.

## 2022-11-01 ENCOUNTER — APPOINTMENT (OUTPATIENT)
Dept: NEUROLOGY | Facility: CLINIC | Age: 67
End: 2022-11-01

## 2022-11-01 VITALS
SYSTOLIC BLOOD PRESSURE: 136 MMHG | HEIGHT: 65 IN | HEART RATE: 87 BPM | BODY MASS INDEX: 22.99 KG/M2 | WEIGHT: 138 LBS | DIASTOLIC BLOOD PRESSURE: 76 MMHG

## 2022-11-01 DIAGNOSIS — R55 SYNCOPE AND COLLAPSE: ICD-10-CM

## 2022-11-01 DIAGNOSIS — E11.29 TYPE 2 DIABETES MELLITUS WITH OTHER DIABETIC KIDNEY COMPLICATION: ICD-10-CM

## 2022-11-01 PROCEDURE — 99203 OFFICE O/P NEW LOW 30 MIN: CPT

## 2022-11-01 PROCEDURE — 99072 ADDL SUPL MATRL&STAF TM PHE: CPT

## 2022-11-01 NOTE — ASSESSMENT
[FreeTextEntry1] :  dizziness/syncope - based on history most likely secondary to uncontrolled diabetes.  Since his medication has been adjusted and all his previous symptoms went away, no further workup or intervention at this time.  This is not seen as a primary neurological issue, therefore he is advised to follow-up with his primary doctor and the endocrinologist.  He can return on as needed basis

## 2022-11-01 NOTE — PHYSICAL EXAM
[Person] : oriented to person [Place] : oriented to place [Time] : oriented to time [Concentration Intact] : normal concentrating ability [Visual Intact] : visual attention was ~T not ~L decreased [Naming Objects] : no difficulty naming common objects [Repeating Phrases] : no difficulty repeating a phrase [Writing A Sentence] : no difficulty writing a sentence [Fluency] : fluency intact [Comprehension] : comprehension intact [Reading] : reading intact [Past History] : adequate knowledge of personal past history [Cranial Nerves Optic (II)] : visual acuity intact bilaterally,  visual fields full to confrontation, pupils equal round and reactive to light [Cranial Nerves Oculomotor (III)] : extraocular motion intact [Cranial Nerves Trigeminal (V)] : facial sensation intact symmetrically [Cranial Nerves Facial (VII)] : face symmetrical [Cranial Nerves Vestibulocochlear (VIII)] : hearing was intact bilaterally [Cranial Nerves Glossopharyngeal (IX)] : tongue and palate midline [Cranial Nerves Accessory (XI - Cranial And Spinal)] : head turning and shoulder shrug symmetric [Cranial Nerves Hypoglossal (XII)] : there was no tongue deviation with protrusion [Motor Tone] : muscle tone was normal in all four extremities [Motor Strength] : muscle strength was normal in all four extremities [No Muscle Atrophy] : normal bulk in all four extremities [Past-pointing] : there was no past-pointing [Tremor] : no tremor present [0] : Ankle jerk left 0 [Plantar Reflex Right Only] : normal on the right [Plantar Reflex Left Only] : normal on the left [FreeTextEntry7] :  decreased sensation distally [FreeTextEntry8] :  wide-based gait, walks with cane, positive Romberg, trouble with tandem

## 2023-09-15 NOTE — PATIENT PROFILE ADULT - FUNCTIONAL ASSESSMENT - BASIC MOBILITY 6.
Controlled Medication Refill Request:    1.  Last Office Visit:  8/25/2023     2.  Next Office Visit:  2/26/2024       3.  Last UDS Date:  none     4.  Last Consent Signed:  none     5.  Medication Requested: Gabapentin    Pharmacy:   [id number] - [place], [place] - [address] AT [facility] - [phone]  - [phone] FX  [address]  [place] [address]  Phone: [phone] Fax: [phone]    [address] [address]  Phone: [phone] Fax: [phone]    [address] [place] [address]  Phone: [phone] Fax: [phone]                3 = A little assistance

## 2023-09-20 NOTE — ED PROVIDER NOTE - NS ED MD DISPO DIVISION
[facility] Bilobed Flap Text: The defect edges were debeveled with a #15 scalpel blade. Given the location of the defect and the proximity to free margins a bilobe flap was deemed most appropriate. Using a sterile surgical marker, an appropriate bilobe flap drawn around the defect. The area thus outlined was incised deep to adipose tissue with a #15 scalpel blade. The skin margins were undermined to an appropriate distance in all directions utilizing iris scissors. Following this, the designed flap was carried over into the primary defect and sutured into place.

## 2024-02-08 ENCOUNTER — EMERGENCY (EMERGENCY)
Facility: HOSPITAL | Age: 69
LOS: 0 days | Discharge: HOME | End: 2024-02-08
Attending: STUDENT IN AN ORGANIZED HEALTH CARE EDUCATION/TRAINING PROGRAM
Payer: COMMERCIAL

## 2024-02-08 VITALS
RESPIRATION RATE: 16 BRPM | WEIGHT: 151.9 LBS | TEMPERATURE: 98 F | DIASTOLIC BLOOD PRESSURE: 67 MMHG | SYSTOLIC BLOOD PRESSURE: 145 MMHG | OXYGEN SATURATION: 98 % | HEART RATE: 81 BPM | HEIGHT: 67 IN

## 2024-02-08 DIAGNOSIS — M54.50 LOW BACK PAIN, UNSPECIFIED: ICD-10-CM

## 2024-02-08 DIAGNOSIS — E11.9 TYPE 2 DIABETES MELLITUS WITHOUT COMPLICATIONS: ICD-10-CM

## 2024-02-08 DIAGNOSIS — M79.605 PAIN IN LEFT LEG: ICD-10-CM

## 2024-02-08 DIAGNOSIS — I10 ESSENTIAL (PRIMARY) HYPERTENSION: ICD-10-CM

## 2024-02-08 PROCEDURE — 99283 EMERGENCY DEPT VISIT LOW MDM: CPT

## 2024-02-08 PROCEDURE — 99284 EMERGENCY DEPT VISIT MOD MDM: CPT

## 2024-02-08 RX ORDER — IBUPROFEN 200 MG
600 TABLET ORAL ONCE
Refills: 0 | Status: COMPLETED | OUTPATIENT
Start: 2024-02-08 | End: 2024-02-08

## 2024-02-08 RX ORDER — METHOCARBAMOL 500 MG/1
2 TABLET, FILM COATED ORAL
Qty: 30 | Refills: 0
Start: 2024-02-08 | End: 2024-02-12

## 2024-02-08 RX ORDER — LIDOCAINE 4 G/100G
1 CREAM TOPICAL ONCE
Refills: 0 | Status: COMPLETED | OUTPATIENT
Start: 2024-02-08 | End: 2024-02-08

## 2024-02-08 RX ORDER — METHOCARBAMOL 500 MG/1
1500 TABLET, FILM COATED ORAL ONCE
Refills: 0 | Status: COMPLETED | OUTPATIENT
Start: 2024-02-08 | End: 2024-02-08

## 2024-02-08 RX ADMIN — METHOCARBAMOL 1500 MILLIGRAM(S): 500 TABLET, FILM COATED ORAL at 12:40

## 2024-02-08 RX ADMIN — Medication 600 MILLIGRAM(S): at 12:40

## 2024-02-08 RX ADMIN — LIDOCAINE 1 PATCH: 4 CREAM TOPICAL at 12:48

## 2024-02-08 NOTE — ED PROVIDER NOTE - PATIENT PORTAL LINK FT
You can access the FollowMyHealth Patient Portal offered by [facility] by registering at the following website: [email or web address]. By joining [facility]’s FollowMyHealth portal, you will also be able to view your health information using other applications (apps) compatible with our system.

## 2024-02-08 NOTE — ED PROVIDER NOTE - NSFOLLOWUPINSTRUCTIONS_ED_ALL_ED_FT
Please take the Robaxin every 8 hours as needed for your pain. Take with Tylenol. Someone from the hospital will call you to schedule you with pain management for your leg pain.    Our Emergency Department Referral Coordinators will be reaching out to you in the next 24-48 hours from 9:00am to 5:00pm with a follow up appointment. Please expect a phone call from the hospital in that time frame. If you do not receive a call or if you have any questions or concerns, you can reach them at   [phone]    Leg Pain    WHAT YOU NEED TO KNOW:    Leg pain may be caused by a variety of health conditions. Your tests did not show any broken bones or blood clots.    DISCHARGE INSTRUCTIONS:    Return to the emergency department if:     You have a fever.      Your leg starts to swell.      Your leg pain gets worse.      You have numbness or tingling in your leg or toes.      You cannot put any weight on or move your leg.    Contact your healthcare provider if:     Your pain does not decrease, even after treatment.      You have questions or concerns about your condition or care.    Medicines:     NSAIDs, such as ibuprofen, help decrease swelling, pain, and fever. This medicine is available with or without a doctor's order. NSAIDs can cause stomach bleeding or kidney problems in certain people. If you take blood thinner medicine, always ask your healthcare provider if NSAIDs are safe for you. Always read the medicine label and follow directions.      Take your medicine as directed. Contact your healthcare provider if you think your medicine is not helping or if you have side effects. Tell him of her if you are allergic to any medicine. Keep a list of the medicines, vitamins, and herbs you take. Include the amounts, and when and why you take them. Bring the list or the pill bottles to follow-up visits. Carry your medicine list with you in case of an emergency.    Follow up with your healthcare provider as directed: You may need more tests to find the cause of your leg pain. You may need to see an orthopedic specialist or a physical therapist. Write down your questions so you remember to ask them during your visits.    Manage your leg pain:     Rest your injured leg so that it can heal. You may need an immobilizer, brace, or splint to limit the movement of your leg. You may need to avoid putting any weight on your leg for at least 48 hours. Return to normal activities as directed.      Ice the injury for 20 minutes every 4 hours for up to 24 hours, or as directed. Use an ice pack, or put crushed ice in a plastic bag. Cover it with a towel to protect your skin. Ice helps prevent tissue damage and decreases swelling and pain.      Elevate your injured leg above the level of your heart as often as you can. This will help decrease swelling and pain. If possible, prop your leg on pillows or blankets to keep the area elevated comfortably.       Use assistive devices as directed. You may need to use a cane or crutches. Assistive devices help decrease pain and pressure on your leg when you walk. Ask your healthcare provider for more information about assistive devices and how to use them correctly.      Maintain a healthy weight. Extra body weight can cause pressure and pain in your hip, knee, and ankle joints. Ask your healthcare provider how much you should weigh. Ask him to help you create a weight loss plan if you are overweight.

## 2024-02-08 NOTE — ED PROVIDER NOTE - PROGRESS NOTE DETAILS
MS–patient was able to ambulate with steady gait.  Pain is improved but still has some pain in the leg.

## 2024-02-08 NOTE — ED ADULT NURSE NOTE - NSFALLRISKINTERV_ED_ALL_ED
Assistance OOB with selected safe patient handling equipment if applicable/Assistance with ambulation/Communicate fall risk and risk factors to all staff, patient, and family/Monitor gait and stability/Provide visual cue: yellow wristband, yellow gown, etc/Reinforce activity limits and safety measures with patient and family/Call bell, personal items and telephone in reach/Instruct patient to call for assistance before getting out of bed/chair/stretcher/Non-slip footwear applied when patient is off stretcher/[place] to call system/Physically safe environment - no spills, clutter or unnecessary equipment/Purposeful Proactive Rounding/Room/bathroom lighting operational, light cord in reach

## 2024-02-08 NOTE — ED PROVIDER NOTE - OBJECTIVE STATEMENT
Patient is a 69-year-old male past medical history of diabetes, hypertension, right kidney transplant presenting for evaluation of left gluteal pain that radiates into his left leg for the last 2 weeks.  Patient denies any falls, trauma, or injuries.  Pain is just progressively gotten worse.  Patient ambulates without cane or walker at baseline.  Patient is able to walk up flights of stairs.  Today it took him longer to walk up a flight of stairs than usual due to pain. He denies any fevers, chills, urinary incontinence, bowel incontinence, numbness, tingling.  Complaining of 10 out of 10 constant pain in the left buttock at this time.

## 2024-02-08 NOTE — ED PROVIDER NOTE - PHYSICAL EXAMINATION
VITAL SIGNS: I have reviewed nursing notes and confirm.  CONSTITUTIONAL: well-appearing, non-toxic, NAD  SKIN: Warm dry, normal skin turgor  HEAD: NCAT  EYES: EOMI, PERRLA, no scleral icterus  ENT: Moist mucous membranes, normal pharynx with no erythema or exudates  NECK: Supple; non tender. Full ROM.  CARD: RRR, no murmurs, rubs or gallops  RESP: clear to ausculation b/l.  No rales, rhonchi, or wheezing.  ABD: soft, non-tender.  EXT: Full ROM, no bony tenderness, no pedal edema, no calf tenderness. (+) discomfort in the left buttock with no palpable tenderness. straight leg negative. Normal PT pulse bilaterally.   NEURO: normal motor. normal sensory. CN II-XII intact.   PSYCH: Cooperative, appropriate.

## 2024-02-08 NOTE — ED PROVIDER NOTE - CLINICAL SUMMARY MEDICAL DECISION MAKING FREE TEXT BOX
69-year-old male, past medical history of diabetes, hypertension, kidney transplant 2015, presenting for left buttock pain, radiating down the leg, for 2 weeks, worsening over time, not alleviated by Tylenol, no associated symptoms.  He states that he is able to ambulate but with pain.  Denies trauma, weakness, numbness, tingling, abdominal pain, back pain.  Well-appearing on exam.  Point tenderness to left superior gluteus.  Normal range of motion at left hip.  Positive straight leg raise on the left.  Medications given and effects reassessed. 69-year-old male, past medical history of diabetes, hypertension, kidney transplant 2015, presenting for left buttock pain, radiating down the leg, for 2 weeks, worsening over time, not alleviated by Tylenol, no associated symptoms.  He states that he is able to ambulate but with pain.  Denies trauma, weakness, numbness, tingling, abdominal pain, back pain.  Well-appearing on exam.  Point tenderness to left superior gluteus.  Normal range of motion at left hip.  Positive straight leg raise on the left.  Medications given and effects reassessed.  Patient feels improved. Ambulated independently. Given return precautions and follow up outpatient. Patient comfortable with plan.

## 2024-02-08 NOTE — ED ADULT TRIAGE NOTE - CHIEF COMPLAINT QUOTE
Pt complaining of L hip pain x 2 weeks. Pt reports chronic back pain and now the pain "is shooting down L leg." Pt denies fall/trauma. Pt states he is unable to ambulate

## 2024-02-12 RX ORDER — CYCLOBENZAPRINE HYDROCHLORIDE 10 MG/1
1 TABLET, FILM COATED ORAL
Qty: 21 | Refills: 0
Start: 2024-02-12 | End: 2024-02-18

## 2024-10-07 ENCOUNTER — APPOINTMENT (OUTPATIENT)
Dept: ORTHOPEDIC SURGERY | Facility: CLINIC | Age: 69
End: 2024-10-07
Payer: COMMERCIAL

## 2024-10-07 DIAGNOSIS — M85.89 OTHER SPECIFIED DISORDERS OF BONE DENSITY AND STRUCTURE, MULTIPLE SITES: ICD-10-CM

## 2024-10-07 DIAGNOSIS — M17.12 UNILATERAL PRIMARY OSTEOARTHRITIS, LEFT KNEE: ICD-10-CM

## 2024-10-07 PROCEDURE — 73562 X-RAY EXAM OF KNEE 3: CPT | Mod: 50

## 2024-10-07 PROCEDURE — 20610 DRAIN/INJ JOINT/BURSA W/O US: CPT | Mod: LT

## 2024-10-07 PROCEDURE — 99203 OFFICE O/P NEW LOW 30 MIN: CPT | Mod: 25

## 2024-10-09 ENCOUNTER — APPOINTMENT (OUTPATIENT)
Dept: ORTHOPEDIC SURGERY | Facility: CLINIC | Age: 69
End: 2024-10-09

## 2025-01-07 ENCOUNTER — APPOINTMENT (OUTPATIENT)
Dept: ORTHOPEDIC SURGERY | Facility: CLINIC | Age: 70
End: 2025-01-07